# Patient Record
Sex: FEMALE | HISPANIC OR LATINO | Employment: OTHER | URBAN - METROPOLITAN AREA
[De-identification: names, ages, dates, MRNs, and addresses within clinical notes are randomized per-mention and may not be internally consistent; named-entity substitution may affect disease eponyms.]

---

## 2019-06-05 ENCOUNTER — TELEPHONE (OUTPATIENT)
Dept: CARDIOLOGY | Facility: CLINIC | Age: 84
End: 2019-06-05

## 2019-06-05 NOTE — TELEPHONE ENCOUNTER
Pt wanting to be seen by dr madden, pt passing out and sleeping a lot per patient daughter, wanting to be seen as soon as she can. The patient has an appointment July 8 @ 2:00, call pt daughter gina @ [phone]

## 2019-07-23 PROBLEM — Z98.890 HISTORY OF LOOP RECORDER: Status: ACTIVE | Noted: 2019-07-23

## 2019-07-23 PROBLEM — E78.5 DYSLIPIDEMIA: Status: ACTIVE | Noted: 2019-07-23

## 2019-07-23 PROBLEM — I10 ESSENTIAL HYPERTENSION: Status: ACTIVE | Noted: 2019-07-23

## 2019-07-23 PROBLEM — R55 SYNCOPE AND COLLAPSE: Status: ACTIVE | Noted: 2019-07-23

## 2019-07-23 PROBLEM — I25.10 CAD (CORONARY ARTERY DISEASE): Status: ACTIVE | Noted: 2019-07-23

## 2019-07-23 PROBLEM — Z95.1 S/P CABG (CORONARY ARTERY BYPASS GRAFT): Status: ACTIVE | Noted: 2019-07-23

## 2019-07-24 ENCOUNTER — OFFICE VISIT (OUTPATIENT)
Dept: CARDIOLOGY | Facility: CLINIC | Age: 84
End: 2019-07-24

## 2019-07-24 VITALS
SYSTOLIC BLOOD PRESSURE: 138 MMHG | BODY MASS INDEX: 24.54 KG/M2 | WEIGHT: 125 LBS | HEIGHT: 60 IN | HEART RATE: 65 BPM | DIASTOLIC BLOOD PRESSURE: 78 MMHG

## 2019-07-24 DIAGNOSIS — I47.29 NONSUSTAINED VENTRICULAR TACHYCARDIA (HCC): ICD-10-CM

## 2019-07-24 DIAGNOSIS — I25.10 CORONARY ARTERY DISEASE INVOLVING NATIVE CORONARY ARTERY OF NATIVE HEART WITHOUT ANGINA PECTORIS: Primary | ICD-10-CM

## 2019-07-24 DIAGNOSIS — I10 ESSENTIAL HYPERTENSION: ICD-10-CM

## 2019-07-24 DIAGNOSIS — R55 SYNCOPE AND COLLAPSE: ICD-10-CM

## 2019-07-24 DIAGNOSIS — I25.5 ISCHEMIC CARDIOMYOPATHY: ICD-10-CM

## 2019-07-24 DIAGNOSIS — Z98.890 HISTORY OF LOOP RECORDER: ICD-10-CM

## 2019-07-24 PROCEDURE — 93291 INTERROG DEV EVAL SCRMS IP: CPT | Performed by: INTERNAL MEDICINE

## 2019-07-24 PROCEDURE — 99214 OFFICE O/P EST MOD 30 MIN: CPT | Performed by: INTERNAL MEDICINE

## 2019-07-24 RX ORDER — BUMETANIDE 1 MG/1
1 TABLET ORAL AS NEEDED
COMMUNITY
Start: 2018-01-08 | End: 2021-08-31

## 2019-07-24 RX ORDER — TIZANIDINE 2 MG/1
2 TABLET ORAL 2 TIMES DAILY
COMMUNITY
End: 2021-03-15 | Stop reason: ALTCHOICE

## 2019-07-24 RX ORDER — ERGOCALCIFEROL 1.25 MG/1
1 CAPSULE ORAL
COMMUNITY
Start: 2018-10-02 | End: 2020-05-05

## 2019-07-24 RX ORDER — LEVOTHYROXINE SODIUM 0.05 MG/1
50 TABLET ORAL DAILY
COMMUNITY
Start: 2019-07-22 | End: 2020-07-21

## 2019-07-24 RX ORDER — LANOLIN ALCOHOL/MO/W.PET/CERES
1000 CREAM (GRAM) TOPICAL DAILY
COMMUNITY
Start: 2019-07-22 | End: 2020-05-05

## 2019-07-24 RX ORDER — LORATADINE 10 MG/1
10 TABLET ORAL DAILY
COMMUNITY
Start: 2019-07-22 | End: 2020-07-21

## 2019-07-24 RX ORDER — DOXAZOSIN MESYLATE 4 MG/1
4 TABLET ORAL DAILY
COMMUNITY
Start: 2019-07-12 | End: 2019-11-25 | Stop reason: ALTCHOICE

## 2019-07-24 RX ORDER — CARVEDILOL 6.25 MG/1
6.25 TABLET ORAL 2 TIMES DAILY
COMMUNITY
Start: 2018-08-01 | End: 2019-08-01

## 2019-07-24 RX ORDER — TRIAMCINOLONE ACETONIDE 55 UG/1
1 SPRAY, METERED NASAL DAILY
COMMUNITY
Start: 2019-07-22 | End: 2021-03-15 | Stop reason: ALTCHOICE

## 2019-07-25 NOTE — ASSESSMENT & PLAN NOTE
She had an episode of nonsustained ventricular tachycardia on June 30, 2019.  She reports no symptoms on that day.  This is the first episode it has been noted since implantation of her loop recorder.  She appears to be intolerant of her carvedilol and I will therefore switch her to metoprolol

## 2019-07-25 NOTE — ASSESSMENT & PLAN NOTE
She is on nitrates, diuretics, beta-blocker therapy and I hope to initiate ACE inhibitor therapy.  I did not want to initiate two new medications at once.

## 2019-07-25 NOTE — PROGRESS NOTES
Subjective:     Encounter Date:07/24/2019      Patient ID: Evita David is a 89 y.o. female.    Chief Complaint:  Chief Complaint   Patient presents with   • Coronary Artery Disease       HPI:  I saw Ms. David in the office today.  He is an 89-year-old female with known coronary disease.  He is Citizen of [place]-speaking and the conversation was interpreted and translated by a member of the office staff.  Evita also has a history of hypertension, dyslipidemia and left ventricular dysfunction.  She had an echocardiogram in December 2018 which demonstrated an ejection fraction of 45 to 50%.  He also has moderate mitral and aortic insufficiency.  She has a loop recorder implanted secondary to multiple syncopal episodes.  She has visited the emergency room on several occasions syncope and no etiology has been found.  Her loop recorder has not demonstrated any arrhythmias until today's reading.  She had an episode of ventricular tachycardia on June 30, 2019.  Does not report any symptoms occurring on that day.    Evita states she had an episode of dizziness this morning.  Her daughter-in-law states that her systolic pressure was 90 mmHg.  Evita has symptoms of chest discomfort or shortness of air.  She does state that he has begun to experience lower extremity edema.  She has not been taking her Bumex on a routine basis just restarted it today.  Upon discussion, the patient and her daughter-in-law feel like the carvedilol is causing symptoms of cough and sneezing.  They state that after their most recent visit to the emergency room, the ER decreased her dose from 12.5 mg twice daily to 6.25 mg once daily.  This was in April 2019.    The following portions of the patient's history were reviewed and updated as appropriate: allergies, current medications, past family history, past medical history, past social history, past surgical history and problem list.    Problem List:  Patient Active Problem List   Diagnosis   •  CAD (coronary artery disease)   • S/P CABG (coronary artery bypass graft)   • Syncope and collapse   • History of loop recorder   • Dyslipidemia   • Essential hypertension   • Nonsustained ventricular tachycardia (CMS/HCC)   • Ischemic cardiomyopathy       Past Medical History:  Past Medical History:   Diagnosis Date   • Anemia    • Arthritis    • Back pain    • Bone pain    • Cancer (CMS/HCC)     BREAST   • Coronary artery disease     2013: LAD with mild proximal disease. Mid with 50-60%. D1 with 70-80%. Circ calcified. OM2 with . Distal OM filled by collaterals: RCA not engaged. SVG to RCA patent with 50-60% distally. Attempt made for PCI to occluded circ , unsuccessful   • Dementia    • Depression    • Dizziness    • Dry eyes    • High cholesterol    • Hypertension    • Indigestion    • Leg weakness, bilateral    • Loss of balance    • Loss of hearing    • Lung cancer (CMS/HCC)     POSS  F/U DR [place]   • Papillary adenocarcinoma of thyroid (CMS/HCC)    • Sleep apnea    • Stomach pain     RECENTLY   • UTI (urinary tract infection)     ON MED PRESENTLY   • Vaginal discharge        Past Surgical History:  Past Surgical History:   Procedure Laterality Date   • APPENDECTOMY     • BREAST EXCISIONAL BIOPSY Right    • CARDIAC CATHETERIZATION     • CARDIAC SURGERY      2002  UNSURE OF PROCEDURE [facility]   • CATARACT EXTRACTION Left    • CHOLECYSTECTOMY OPEN     • CORONARY ARTERY BYPASS GRAFT     • EYE SURGERY     • THYROIDECTOMY Right 7/21/2016    Procedure: RT THYROIDECTOMY  W/ F.S.;  Surgeon: Travis Segovia MD;  [place] [facility] [place] [facility] [place]:        Social History:  Social History     Socioeconomic History   • Marital status:      Spouse name: Not on file   • Number of children: Not on file   • Years of education: Not on file   • Highest education level: Not on file   Tobacco Use   • Smoking status: Never Smoker   • Smokeless tobacco: Never Used   Substance and Sexual Activity   • Alcohol use: No   •  "Drug use: No   • Sexual activity: Defer       Allergies:  Allergies   Allergen Reactions   • Cephalexin Unknown (See Comments)     .           ROS:  Review of Systems   Constitution: Positive for malaise/fatigue. Negative for chills, decreased appetite, fever, weight gain and weight loss.   HENT: Positive for congestion. Negative for hoarse voice, nosebleeds and sore throat.    Eyes: Negative for blurred vision, double vision and visual disturbance.   Cardiovascular: Positive for leg swelling and syncope. Negative for chest pain, claudication, dyspnea on exertion, irregular heartbeat, near-syncope, orthopnea, palpitations and paroxysmal nocturnal dyspnea.   Respiratory: Negative for cough, hemoptysis, shortness of breath, sleep disturbances due to breathing, snoring, sputum production and wheezing.    Endocrine: Negative for cold intolerance, heat intolerance, polydipsia and polyuria.   Hematologic/Lymphatic: Negative for adenopathy and bleeding problem. Does not bruise/bleed easily.   Skin: Negative for flushing, itching, nail changes and rash.   Musculoskeletal: Positive for joint pain. Negative for arthritis, back pain, muscle cramps, muscle weakness, myalgias and neck pain.   Gastrointestinal: Positive for heartburn. Negative for bloating, abdominal pain, anorexia, change in bowel habit, constipation, diarrhea, hematemesis, hematochezia, jaundice, melena, nausea and vomiting.   Genitourinary: Negative for dysuria, hematuria and nocturia.   Neurological: Positive for dizziness. Negative for brief paralysis, disturbances in coordination, excessive daytime sleepiness, headaches, light-headedness, loss of balance, numbness, paresthesias, seizures and vertigo.   Psychiatric/Behavioral: Negative for altered mental status and depression. The patient is not nervous/anxious.    Allergic/Immunologic: Negative for environmental allergies and hives.          Objective:         /78   Pulse 65   Ht 152.4 cm (60\")   " Wt 56.7 kg (125 lb)   BMI 24.41 kg/m²     Physical Exam   Constitutional: She is oriented to person, place, and time. She appears well-developed and well-nourished. No distress.   HENT:   Head: Normocephalic and atraumatic.   Mouth/Throat: Oropharynx is clear and moist.   Eyes: Conjunctivae and EOM are normal. Pupils are equal, round, and reactive to light. No scleral icterus.   Neck: Normal range of motion. Neck supple. No thyromegaly present.   Cardiovascular: Normal rate, regular rhythm, S1 normal, S2 normal and intact distal pulses.  No extrasystoles are present. PMI is not displaced. Exam reveals no gallop, no S3, no S4, no friction rub and no decreased pulses.   Murmur ( There is a 2/6 systolic murmur heard at the left lower sternal border and apex.  There is a 1/6 diastolic murmur heard at the mid left sternal border) heard.  Pulses:       Carotid pulses are 2+ on the right side, and 2+ on the left side.       Dorsalis pedis pulses are 2+ on the right side, and 2+ on the left side.        Posterior tibial pulses are 1+ on the right side, and 1+ on the left side.   Pulmonary/Chest: Effort normal and breath sounds normal. No respiratory distress. She has no wheezes. She has no rales.   Abdominal: Soft. Bowel sounds are normal. She exhibits no distension and no mass. There is no tenderness. There is no rebound and no guarding.   Musculoskeletal: Normal range of motion. She exhibits edema ( There is 1-2+ pitting edema in the lower extremities bilaterally).   Lymphadenopathy:     She has no cervical adenopathy.   Neurological: She is alert and oriented to person, place, and time. Coordination normal.   Skin: Skin is warm and dry. No rash noted. She is not diaphoretic. No pallor.   Psychiatric: She has a normal mood and affect. Her behavior is normal.   Nursing note and vitals reviewed.      In-Office Procedure(s):  Procedures    ASCVD RIsk Score::  The ASCVD Risk score ([place] BEST Jr., et al., 2013) failed to  calculate for the following reasons:    The 2013 ASCVD risk score is only valid for ages 40 to 79    Recent Radiology:  Imaging Results (most recent)     None          Lab Review:   not applicable           Invalid input(s): ALKPO4                        Invalid input(s): LDLCALC                  Problems Addressed this Visit        Cardiovascular and Mediastinum    CAD (coronary artery disease) - Primary     Currently without angina         Relevant Medications    carvedilol (COREG) 6.25 MG tablet    Syncope and collapse     Recurrent episodes of syncope.  He has been evaluated by numerous physicians and no etiology has been discovered.  There is a loop recorder in place.         Essential hypertension     Hypertension is controlled.         Relevant Medications    bumetanide (BUMEX) 1 MG tablet    carvedilol (COREG) 6.25 MG tablet    doxazosin (CARDURA) 4 MG tablet    Nonsustained ventricular tachycardia (CMS/HCC)     She had an episode of nonsustained ventricular tachycardia on June 30, 2019.  She reports no symptoms on that day.  This is the first episode it has been noted since implantation of her loop recorder.  She appears to be intolerant of her carvedilol and I will therefore switch her to metoprolol         Relevant Medications    carvedilol (COREG) 6.25 MG tablet    Ischemic cardiomyopathy     She is on nitrates, diuretics, beta-blocker therapy and I hope to initiate ACE inhibitor therapy.  I did not want to initiate two new medications at once.         Relevant Medications    carvedilol (COREG) 6.25 MG tablet       Other    History of loop recorder          Gail Uriarte MD  07/24/19  .

## 2019-08-26 ENCOUNTER — OFFICE VISIT (OUTPATIENT)
Dept: CARDIOLOGY | Facility: CLINIC | Age: 84
End: 2019-08-26

## 2019-08-26 VITALS
DIASTOLIC BLOOD PRESSURE: 80 MMHG | BODY MASS INDEX: 24.54 KG/M2 | WEIGHT: 125 LBS | HEART RATE: 61 BPM | HEIGHT: 60 IN | SYSTOLIC BLOOD PRESSURE: 140 MMHG

## 2019-08-26 DIAGNOSIS — I25.5 ISCHEMIC CARDIOMYOPATHY: Primary | ICD-10-CM

## 2019-08-26 DIAGNOSIS — Z98.890 HISTORY OF LOOP RECORDER: ICD-10-CM

## 2019-08-26 DIAGNOSIS — I10 ESSENTIAL HYPERTENSION: ICD-10-CM

## 2019-08-26 DIAGNOSIS — I25.10 CORONARY ARTERY DISEASE INVOLVING NATIVE CORONARY ARTERY OF NATIVE HEART WITHOUT ANGINA PECTORIS: ICD-10-CM

## 2019-08-26 DIAGNOSIS — I47.29 NONSUSTAINED VENTRICULAR TACHYCARDIA (HCC): ICD-10-CM

## 2019-08-26 PROBLEM — N28.9 RENAL INSUFFICIENCY: Status: ACTIVE | Noted: 2019-08-26

## 2019-08-26 PROBLEM — R74.8 ELEVATED LIVER ENZYMES: Status: ACTIVE | Noted: 2019-08-26

## 2019-08-26 PROCEDURE — 99214 OFFICE O/P EST MOD 30 MIN: CPT | Performed by: INTERNAL MEDICINE

## 2019-08-26 RX ORDER — METOPROLOL SUCCINATE 25 MG/1
25 TABLET, EXTENDED RELEASE ORAL DAILY
Qty: 90 TABLET | Refills: 3 | Status: SHIPPED | OUTPATIENT
Start: 2019-08-26 | End: 2020-08-17

## 2019-08-26 RX ORDER — METOPROLOL TARTRATE 50 MG/1
50 TABLET, FILM COATED ORAL
COMMUNITY
Start: 2019-08-23 | End: 2019-08-26

## 2019-08-26 RX ORDER — LEVETIRACETAM 500 MG/1
250 TABLET ORAL 2 TIMES DAILY
Refills: 0 | COMMUNITY
Start: 2019-07-27 | End: 2019-11-25 | Stop reason: ALTCHOICE

## 2019-08-26 NOTE — ASSESSMENT & PLAN NOTE
I did explain via  that metoprolol tartrate will only cover approximately half the day due to the shorter half-life.  I explained if they want to get this medication once a day, I would need to change this to metoprolol extended release.  After much discussion they have decided to go this route.  I have sent a prescription and form his metoprolol extended release 25 mg daily

## 2019-08-26 NOTE — ASSESSMENT & PLAN NOTE
Her blood pressure remains slightly elevated in the office.  Her daughter states that she is obtaining readings in the 120s to 130s over 80s at home

## 2019-11-25 ENCOUNTER — OFFICE VISIT (OUTPATIENT)
Dept: CARDIOLOGY | Facility: CLINIC | Age: 84
End: 2019-11-25

## 2019-11-25 VITALS
DIASTOLIC BLOOD PRESSURE: 78 MMHG | BODY MASS INDEX: 26.11 KG/M2 | HEART RATE: 75 BPM | WEIGHT: 133 LBS | HEIGHT: 60 IN | SYSTOLIC BLOOD PRESSURE: 144 MMHG

## 2019-11-25 DIAGNOSIS — Z98.890 HISTORY OF LOOP RECORDER: ICD-10-CM

## 2019-11-25 DIAGNOSIS — I25.5 ISCHEMIC CARDIOMYOPATHY: Primary | ICD-10-CM

## 2019-11-25 DIAGNOSIS — I25.10 CORONARY ARTERY DISEASE INVOLVING NATIVE CORONARY ARTERY OF NATIVE HEART WITHOUT ANGINA PECTORIS: ICD-10-CM

## 2019-11-25 DIAGNOSIS — I38 VALVULAR HEART DISEASE: ICD-10-CM

## 2019-11-25 DIAGNOSIS — Z95.1 S/P CABG (CORONARY ARTERY BYPASS GRAFT): ICD-10-CM

## 2019-11-25 DIAGNOSIS — I10 ESSENTIAL HYPERTENSION: ICD-10-CM

## 2019-11-25 DIAGNOSIS — E78.5 DYSLIPIDEMIA: ICD-10-CM

## 2019-11-25 PROCEDURE — 93291 INTERROG DEV EVAL SCRMS IP: CPT | Performed by: INTERNAL MEDICINE

## 2019-11-25 PROCEDURE — 99213 OFFICE O/P EST LOW 20 MIN: CPT | Performed by: INTERNAL MEDICINE

## 2019-11-25 NOTE — PROGRESS NOTES
Subjective:     Encounter Date:11/25/2019      Patient ID: Evita David is a 89 y.o. female.    Chief Complaint:  Chief Complaint   Patient presents with   • Cardiomyopathy   • Coronary Artery Disease       HPI:  History of Present Illness  I saw Ms. David in the office today.  She is a 89-year-old female with a history of coronary artery disease.  She is status post previous coronary artery bypass grafting.  She has a history of essential hypertension, nonsustained ventricular tachycardia, ischemic cardiomyopathy (in December 2018, EF was 45 to 50%).  She has aortic insufficiency.  She has a loop recorder in place and interrogation reveals no recent nonsustained ventricular tachycardia.    Evita is in the office today for ongoing evaluation of her cardiac status.  She does complain of lower extremity edema but she does not take her Bumex.  She is contemplating dental extraction and dentures.  She states that she would need approximately 4 teeth extracted.  She has not had any recent overt heart failure symptoms.  She does have chest discomfort but it is atypical for myocardial ischemia.      The following portions of the patient's history were reviewed and updated as appropriate: allergies, current medications, past family history, past medical history, past social history, past surgical history and problem list.    Problem List:  Patient Active Problem List   Diagnosis   • CAD (coronary artery disease)   • S/P CABG (coronary artery bypass graft)   • Syncope and collapse   • History of loop recorder   • Dyslipidemia   • Essential hypertension   • Nonsustained ventricular tachycardia (CMS/HCC)   • Ischemic cardiomyopathy   • Renal insufficiency   • Elevated liver enzymes   • Valvular heart disease       Past Medical History:  Past Medical History:   Diagnosis Date   • Anemia    • Arthritis    • Back pain    • Bone pain    • Cancer (CMS/HCC)     BREAST   • Coronary artery disease     2013: LAD with mild  proximal disease. Mid with 50-60%. D1 with 70-80%. Circ calcified. OM2 with . Distal OM filled by collaterals: RCA not engaged. SVG to RCA patent with 50-60% distally. Attempt made for PCI to occluded circ , unsuccessful   • Dementia (CMS/HCC)    • Depression    • Dizziness    • Dry eyes    • High cholesterol    • Hypertension    • Indigestion    • Leg weakness, bilateral    • Loss of balance    • Loss of hearing    • Lung cancer (CMS/HCC)     POSS  F/U DR [place]   • Papillary adenocarcinoma of thyroid (CMS/HCC)    • Sleep apnea    • Stomach pain     RECENTLY   • UTI (urinary tract infection)     ON MED PRESENTLY   • Vaginal discharge        Past Surgical History:  Past Surgical History:   Procedure Laterality Date   • APPENDECTOMY     • BREAST EXCISIONAL BIOPSY Right    • CARDIAC CATHETERIZATION     • CARDIAC SURGERY      2002  UNSURE OF PROCEDURE [facility]   • CATARACT EXTRACTION Left    • CHOLECYSTECTOMY OPEN     • CORONARY ARTERY BYPASS GRAFT     • EYE SURGERY     • THYROIDECTOMY Right 7/21/2016    Procedure: RT THYROIDECTOMY  W/ F.S.;  Surgeon: Travis Segovia MD;  [place] [facility] [place] [facility] [place]  Service:        Social History:  Social History     Socioeconomic History   • Marital status:      Spouse name: Not on file   • Number of children: Not on file   • Years of education: Not on file   • Highest education level: Not on file   Tobacco Use   • Smoking status: Never Smoker   • Smokeless tobacco: Never Used   Substance and Sexual Activity   • Alcohol use: No   • Drug use: No   • Sexual activity: Defer       Allergies:  Allergies   Allergen Reactions   • Cephalexin Unknown (See Comments)     .       Immunizations:    There is no immunization history on file for this patient.    ROS:  Review of Systems   Constitution: Positive for malaise/fatigue. Negative for chills, decreased appetite, fever, weight gain and weight loss.   HENT: Negative for hoarse voice, nosebleeds and sore throat.    Eyes: Negative for  "blurred vision, double vision and visual disturbance.   Cardiovascular: Positive for chest pain ( Brief) and leg swelling. Negative for claudication, dyspnea on exertion, irregular heartbeat, near-syncope, orthopnea, palpitations and paroxysmal nocturnal dyspnea.   Respiratory: Negative for cough, hemoptysis, shortness of breath, sleep disturbances due to breathing, snoring, sputum production and wheezing.    Endocrine: Negative for cold intolerance, heat intolerance, polydipsia and polyuria.   Hematologic/Lymphatic: Negative for adenopathy and bleeding problem. Does not bruise/bleed easily.   Skin: Negative for flushing, itching, nail changes and rash.   Musculoskeletal: Positive for joint pain and muscle cramps. Negative for arthritis, back pain, muscle weakness, myalgias and neck pain.   Gastrointestinal: Positive for heartburn. Negative for bloating, abdominal pain, anorexia, change in bowel habit, constipation, diarrhea, hematemesis, hematochezia, jaundice, melena, nausea and vomiting.   Genitourinary: Negative for dysuria, hematuria and nocturia.   Neurological: Positive for dizziness. Negative for brief paralysis, disturbances in coordination, excessive daytime sleepiness, headaches, light-headedness, loss of balance, numbness, paresthesias, seizures and vertigo.   Psychiatric/Behavioral: Negative for altered mental status and depression. The patient is not nervous/anxious.    Allergic/Immunologic: Negative for environmental allergies and hives.          Objective:         /78   Pulse 75   Ht 152.4 cm (60\")   Wt 60.3 kg (133 lb)   BMI 25.97 kg/m²     Physical Exam   Constitutional: She is oriented to person, place, and time. She appears well-developed and well-nourished. No distress.   HENT:   Head: Normocephalic and atraumatic.   Mouth/Throat: Oropharynx is clear and moist.   Eyes: Conjunctivae and EOM are normal. Pupils are equal, round, and reactive to light. No scleral icterus.   Neck: Normal " range of motion. Neck supple. No thyromegaly present.   Cardiovascular: Normal rate, regular rhythm, S1 normal, S2 normal and intact distal pulses.  No extrasystoles are present. PMI is not displaced. Exam reveals no gallop, no S3, no S4, no friction rub and no decreased pulses.   Murmur ( There is a 2/6 systolic murmur heard at the left lower sternal border and apex.  There is a 1/6 diastolic murmur heard at the mid left sternal border) heard.  Pulses:       Carotid pulses are 2+ on the right side, and 2+ on the left side.       Dorsalis pedis pulses are 2+ on the right side, and 2+ on the left side.        Posterior tibial pulses are 2+ on the right side, and 2+ on the left side.   Pulmonary/Chest: Effort normal and breath sounds normal. No respiratory distress. She has no wheezes. She has no rales.   Abdominal: Soft. Bowel sounds are normal. She exhibits no distension and no mass. There is no tenderness. There is no rebound and no guarding.   Musculoskeletal: Edema:  There is 1-2+ pitting edema in the lower extremities bilaterally.   Slow gait   Lymphadenopathy:     She has no cervical adenopathy.   Neurological: She is alert and oriented to person, place, and time. Coordination normal.   Skin: Skin is warm and dry. No rash noted. She is not diaphoretic. No pallor.   Psychiatric: She has a normal mood and affect. Her behavior is normal.   Nursing note and vitals reviewed.      In-Office Procedure(s):  Procedures    ASCVD RIsk Score::  The ASCVD Risk score ([place] NASIR Jr., et al., 2013) failed to calculate for the following reasons:    The 2013 ASCVD risk score is only valid for ages 40 to 79    Recent Radiology:  Imaging Results (Most Recent)     None          Lab Review:   not applicable             Assessment:          Diagnosis Plan   1. Ischemic cardiomyopathy  Adult Transthoracic Echo Complete W/ Cont if Necessary Per Protocol   2. Essential hypertension     3. S/P CABG (coronary artery bypass graft)     4.  History of loop recorder     5. Dyslipidemia     6. Coronary artery disease involving native coronary artery of native heart without angina pectoris     7. Valvular heart disease  Adult Transthoracic Echo Complete W/ Cont if Necessary Per Protocol          Plan:   1.  Ischemic cardiomyopathy  I will repeat her echocardiogram to make sure that her ventricular function has remained stable    2.  Essential hypertension  Her blood pressure is mildly elevated    3.  CAD/status post CABG  She has had previous coronary artery bypass grafting.  She has been managed medically and actually has done quite well    4.  Valvular heart disease  She has moderate aortic and mitral insufficiency.  She does have some lower extremity edema but she has not taken her Bumex.  I have encouraged her to do so    I feel like she would tolerate dental extraction from a cardiac standpoint        Level of Care:                 Gail Uriarte MD  11/25/19  .

## 2019-12-04 ENCOUNTER — CLINICAL SUPPORT (OUTPATIENT)
Dept: CARDIOLOGY | Facility: CLINIC | Age: 84
End: 2019-12-04

## 2019-12-04 ENCOUNTER — HOSPITAL ENCOUNTER (OUTPATIENT)
Dept: CARDIOLOGY | Facility: HOSPITAL | Age: 84
Discharge: HOME | End: 2019-12-04
Admitting: INTERNAL MEDICINE

## 2019-12-04 VITALS
HEART RATE: 70 BPM | HEIGHT: 60 IN | SYSTOLIC BLOOD PRESSURE: 199 MMHG | DIASTOLIC BLOOD PRESSURE: 98 MMHG | WEIGHT: 133 LBS | BODY MASS INDEX: 26.11 KG/M2

## 2019-12-04 DIAGNOSIS — I38 VALVULAR HEART DISEASE: ICD-10-CM

## 2019-12-04 DIAGNOSIS — R55 SYNCOPE AND COLLAPSE: ICD-10-CM

## 2019-12-04 DIAGNOSIS — I25.5 ISCHEMIC CARDIOMYOPATHY: ICD-10-CM

## 2019-12-04 PROCEDURE — 93299 PR REM INTERROG ICPMS/SCRMS <30 D TECH REVIEW: CPT | Performed by: INTERNAL MEDICINE

## 2019-12-04 PROCEDURE — 93306 TTE W/DOPPLER COMPLETE: CPT

## 2019-12-04 PROCEDURE — 93298 REM INTERROG DEV EVAL SCRMS: CPT | Performed by: INTERNAL MEDICINE

## 2019-12-04 PROCEDURE — 93306 TTE W/DOPPLER COMPLETE: CPT | Performed by: INTERNAL MEDICINE

## 2019-12-06 ENCOUNTER — TELEPHONE (OUTPATIENT)
Dept: CARDIOLOGY | Facility: CLINIC | Age: 84
End: 2019-12-06

## 2019-12-06 NOTE — TELEPHONE ENCOUNTER
----- Message from Xander Louise sent at 12/6/2019  9:06 AM EST -----  Contact: [phone]  Patient's daughter called(Sarita), would like to know if patient can get a letter for cardiac clearance to have teeth extraction @ [facility]. Would like to  letter when ready.    [facility] [employer] [facility] Tel.# [phone].

## 2019-12-08 LAB
AORTIC DIMENSIONLESS INDEX: 0.7 (DI)
BH CV ECHO MEAS - ACS: 1.9 CM
BH CV ECHO MEAS - AI DEC SLOPE: 211 CM/SEC^2
BH CV ECHO MEAS - AI MAX PG: 50.1 MMHG
BH CV ECHO MEAS - AI MAX VEL: 354 CM/SEC
BH CV ECHO MEAS - AI P1/2T: 491.4 MSEC
BH CV ECHO MEAS - AO MAX PG (FULL): 3.8 MMHG
BH CV ECHO MEAS - AO MAX PG: 7.5 MMHG
BH CV ECHO MEAS - AO MEAN PG (FULL): 2 MMHG
BH CV ECHO MEAS - AO MEAN PG: 4 MMHG
BH CV ECHO MEAS - AO ROOT AREA (BSA CORRECTED): 1.8
BH CV ECHO MEAS - AO ROOT AREA: 6.6 CM^2
BH CV ECHO MEAS - AO ROOT DIAM: 2.9 CM
BH CV ECHO MEAS - AO V2 MAX: 137 CM/SEC
BH CV ECHO MEAS - AO V2 MEAN: 85.2 CM/SEC
BH CV ECHO MEAS - AO V2 VTI: 26.9 CM
BH CV ECHO MEAS - AVA(I,A): 2.1 CM^2
BH CV ECHO MEAS - AVA(I,D): 2.1 CM^2
BH CV ECHO MEAS - AVA(V,A): 2 CM^2
BH CV ECHO MEAS - AVA(V,D): 2 CM^2
BH CV ECHO MEAS - BSA(HAYCOCK): 1.6 M^2
BH CV ECHO MEAS - BSA: 1.6 M^2
BH CV ECHO MEAS - BZI_BMI: 26 KILOGRAMS/M^2
BH CV ECHO MEAS - BZI_METRIC_HEIGHT: 152.4 CM
BH CV ECHO MEAS - BZI_METRIC_WEIGHT: 60.3 KG
BH CV ECHO MEAS - EDV(CUBED): 132.7 ML
BH CV ECHO MEAS - EDV(MOD-SP2): 75 ML
BH CV ECHO MEAS - EDV(MOD-SP4): 73 ML
BH CV ECHO MEAS - EDV(TEICH): 123.8 ML
BH CV ECHO MEAS - EF(CUBED): 55.3 %
BH CV ECHO MEAS - EF(MOD-BP): 55 %
BH CV ECHO MEAS - EF(MOD-SP2): 57.3 %
BH CV ECHO MEAS - EF(MOD-SP4): 56.2 %
BH CV ECHO MEAS - EF(TEICH): 46.8 %
BH CV ECHO MEAS - ESV(CUBED): 59.3 ML
BH CV ECHO MEAS - ESV(MOD-SP2): 32 ML
BH CV ECHO MEAS - ESV(MOD-SP4): 32 ML
BH CV ECHO MEAS - ESV(TEICH): 65.9 ML
BH CV ECHO MEAS - FS: 23.5 %
BH CV ECHO MEAS - IVS/LVPW: 1.3
BH CV ECHO MEAS - IVSD: 1.4 CM
BH CV ECHO MEAS - LAT PEAK E' VEL: 5.9 CM/SEC
BH CV ECHO MEAS - LV DIASTOLIC VOL/BSA (35-75): 46.5 ML/M^2
BH CV ECHO MEAS - LV MASS(C)D: 255.5 GRAMS
BH CV ECHO MEAS - LV MASS(C)DI: 162.8 GRAMS/M^2
BH CV ECHO MEAS - LV MAX PG: 3.7 MMHG
BH CV ECHO MEAS - LV MEAN PG: 2 MMHG
BH CV ECHO MEAS - LV SYSTOLIC VOL/BSA (12-30): 20.4 ML/M^2
BH CV ECHO MEAS - LV V1 MAX: 96.8 CM/SEC
BH CV ECHO MEAS - LV V1 MEAN: 62.1 CM/SEC
BH CV ECHO MEAS - LV V1 VTI: 20.1 CM
BH CV ECHO MEAS - LVIDD: 5.1 CM
BH CV ECHO MEAS - LVIDS: 3.9 CM
BH CV ECHO MEAS - LVLD AP2: 6.9 CM
BH CV ECHO MEAS - LVLD AP4: 6.6 CM
BH CV ECHO MEAS - LVLS AP2: 5.2 CM
BH CV ECHO MEAS - LVLS AP4: 5.9 CM
BH CV ECHO MEAS - LVOT AREA (M): 2.8 CM^2
BH CV ECHO MEAS - LVOT AREA: 2.8 CM^2
BH CV ECHO MEAS - LVOT DIAM: 1.9 CM
BH CV ECHO MEAS - LVPWD: 1.1 CM
BH CV ECHO MEAS - MED PEAK E' VEL: 4.05 CM/SEC
BH CV ECHO MEAS - MV A DUR: 0.14 SEC
BH CV ECHO MEAS - MV A MAX VEL: 120 CM/SEC
BH CV ECHO MEAS - MV DEC SLOPE: 724 CM/SEC^2
BH CV ECHO MEAS - MV DEC TIME: 161 SEC
BH CV ECHO MEAS - MV E MAX VEL: 123 CM/SEC
BH CV ECHO MEAS - MV E/A: 1
BH CV ECHO MEAS - MV MAX PG: 7.6 MMHG
BH CV ECHO MEAS - MV MEAN PG: 4 MMHG
BH CV ECHO MEAS - MV P1/2T MAX VEL: 145 CM/SEC
BH CV ECHO MEAS - MV P1/2T: 58.7 MSEC
BH CV ECHO MEAS - MV V2 MAX: 138 CM/SEC
BH CV ECHO MEAS - MV V2 MEAN: 101 CM/SEC
BH CV ECHO MEAS - MV V2 VTI: 30.9 CM
BH CV ECHO MEAS - MVA P1/2T LCG: 1.5 CM^2
BH CV ECHO MEAS - MVA(P1/2T): 3.8 CM^2
BH CV ECHO MEAS - MVA(VTI): 1.8 CM^2
BH CV ECHO MEAS - PA ACC TIME: 0.09 SEC
BH CV ECHO MEAS - PA MAX PG (FULL): 1.1 MMHG
BH CV ECHO MEAS - PA MAX PG: 3.4 MMHG
BH CV ECHO MEAS - PA PR(ACCEL): 40.8 MMHG
BH CV ECHO MEAS - PA V2 MAX: 92.3 CM/SEC
BH CV ECHO MEAS - PI END-D VEL: 167 CM/SEC
BH CV ECHO MEAS - PULM A REVS DUR: 0.11 SEC
BH CV ECHO MEAS - PULM A REVS VEL: 37.8 CM/SEC
BH CV ECHO MEAS - PULM DIAS VEL: 63.5 CM/SEC
BH CV ECHO MEAS - PULM S/D: 0.76
BH CV ECHO MEAS - PULM SYS VEL: 48.5 CM/SEC
BH CV ECHO MEAS - RAP SYSTOLE: 3 MMHG
BH CV ECHO MEAS - RV MAX PG: 2.3 MMHG
BH CV ECHO MEAS - RV MEAN PG: 1 MMHG
BH CV ECHO MEAS - RV V1 MAX: 75.8 CM/SEC
BH CV ECHO MEAS - RV V1 MEAN: 48.3 CM/SEC
BH CV ECHO MEAS - RV V1 VTI: 14.6 CM
BH CV ECHO MEAS - RVSP: 36 MMHG
BH CV ECHO MEAS - SI(AO): 113.2 ML/M^2
BH CV ECHO MEAS - SI(CUBED): 46.7 ML/M^2
BH CV ECHO MEAS - SI(LVOT): 36.3 ML/M^2
BH CV ECHO MEAS - SI(MOD-SP2): 27.4 ML/M^2
BH CV ECHO MEAS - SI(MOD-SP4): 26.1 ML/M^2
BH CV ECHO MEAS - SI(TEICH): 36.9 ML/M^2
BH CV ECHO MEAS - SV(AO): 177.7 ML
BH CV ECHO MEAS - SV(CUBED): 73.3 ML
BH CV ECHO MEAS - SV(LVOT): 57 ML
BH CV ECHO MEAS - SV(MOD-SP2): 43 ML
BH CV ECHO MEAS - SV(MOD-SP4): 41 ML
BH CV ECHO MEAS - SV(TEICH): 57.9 ML
BH CV ECHO MEAS - TAPSE (>1.6): 1.3 CM2
BH CV ECHO MEAS - TR MAX PG: 33
BH CV ECHO MEAS - TR MAX VEL: 287 CM/SEC
BH CV ECHO MEASUREMENTS AVERAGE E/E' RATIO: 24.72
BH CV XLRA - RV BASE: 3.3 CM
BH CV XLRA - TDI S': 5.8 CM/SEC
LEFT ATRIUM VOLUME INDEX: 30.1 ML/M2
MAXIMAL PREDICTED HEART RATE: 131 BPM
STRESS TARGET HR: 111 BPM

## 2019-12-09 NOTE — TELEPHONE ENCOUNTER
Brenda, can you please call them & let them know that we will need the [facility] [employer] [facility] to send us a form, describing what procedure is planned. Thank you

## 2020-01-05 PROCEDURE — 93298 REM INTERROG DEV EVAL SCRMS: CPT | Performed by: INTERNAL MEDICINE

## 2020-01-05 PROCEDURE — G2066 INTER DEVC REMOTE 30D: HCPCS | Performed by: INTERNAL MEDICINE

## 2020-01-06 ENCOUNTER — CLINICAL SUPPORT (OUTPATIENT)
Dept: CARDIOLOGY | Facility: CLINIC | Age: 85
End: 2020-01-06

## 2020-01-06 DIAGNOSIS — I47.29 NONSUSTAINED VENTRICULAR TACHYCARDIA (HCC): ICD-10-CM

## 2020-01-06 DIAGNOSIS — R55 SYNCOPE AND COLLAPSE: ICD-10-CM

## 2020-03-07 PROCEDURE — 93298 REM INTERROG DEV EVAL SCRMS: CPT | Performed by: INTERNAL MEDICINE

## 2020-03-07 PROCEDURE — G2066 INTER DEVC REMOTE 30D: HCPCS | Performed by: INTERNAL MEDICINE

## 2020-03-09 ENCOUNTER — CLINICAL SUPPORT (OUTPATIENT)
Dept: CARDIOLOGY | Facility: CLINIC | Age: 85
End: 2020-03-09

## 2020-03-09 DIAGNOSIS — Z98.890 HISTORY OF LOOP RECORDER: ICD-10-CM

## 2020-03-09 DIAGNOSIS — R55 SYNCOPE AND COLLAPSE: ICD-10-CM

## 2020-03-09 DIAGNOSIS — I47.29 NONSUSTAINED VENTRICULAR TACHYCARDIA (HCC): ICD-10-CM

## 2020-04-09 ENCOUNTER — CLINICAL SUPPORT (OUTPATIENT)
Dept: CARDIOLOGY | Facility: CLINIC | Age: 85
End: 2020-04-09

## 2020-04-09 DIAGNOSIS — R55 SYNCOPE AND COLLAPSE: ICD-10-CM

## 2020-04-09 DIAGNOSIS — Z98.890 HISTORY OF LOOP RECORDER: ICD-10-CM

## 2020-04-09 DIAGNOSIS — I47.29 NONSUSTAINED VENTRICULAR TACHYCARDIA (HCC): ICD-10-CM

## 2020-04-09 PROCEDURE — 93298 REM INTERROG DEV EVAL SCRMS: CPT | Performed by: INTERNAL MEDICINE

## 2020-04-09 PROCEDURE — G2066 INTER DEVC REMOTE 30D: HCPCS | Performed by: INTERNAL MEDICINE

## 2020-05-05 ENCOUNTER — TELEMEDICINE (OUTPATIENT)
Dept: CARDIOLOGY | Facility: CLINIC | Age: 85
End: 2020-05-05

## 2020-05-05 DIAGNOSIS — I25.5 ISCHEMIC CARDIOMYOPATHY: ICD-10-CM

## 2020-05-05 DIAGNOSIS — E78.5 DYSLIPIDEMIA: ICD-10-CM

## 2020-05-05 DIAGNOSIS — I38 VALVULAR HEART DISEASE: ICD-10-CM

## 2020-05-05 DIAGNOSIS — I25.10 CORONARY ARTERY DISEASE INVOLVING NATIVE CORONARY ARTERY OF NATIVE HEART WITHOUT ANGINA PECTORIS: Primary | ICD-10-CM

## 2020-05-05 DIAGNOSIS — I10 ESSENTIAL HYPERTENSION: ICD-10-CM

## 2020-05-05 PROCEDURE — 99441 PR PHYS/QHP TELEPHONE EVALUATION 5-10 MIN: CPT | Performed by: INTERNAL MEDICINE

## 2020-05-05 RX ORDER — MELATONIN
1000 DAILY
COMMUNITY

## 2020-05-05 NOTE — PROGRESS NOTES
Subjective:     Encounter Date:05/05/2020      Patient ID: Evita David is a 89 y.o. female.    Chief Complaint:  Chief Complaint   Patient presents with   • Follow-up       HPI:  History of Present Illness       You have chosen to receive care through a telephone visit. Do you consent to use a telephone visit for your medical care today? Yes  I had a telephone visit with Ms. David via .  Patient does not speak English.  Purpose of the visit was ongoing care of her underlying coronary disease.  She is a 89-year-old female with a history of coronary artery disease.  She is status post previous coronary artery bypass grafting.  She has a history of essential hypertension, nonsustained ventricular tachycardia, ischemic cardiomyopathy (in December 2018, EF was 45 to 50%).  She has aortic insufficiency.  She has a loop recorder in place and interrogation reveals no recent nonsustained ventricular tachycardia.    Ms. David (via ) is not complaining of chest discomfort or shortness of air.  She denies palpitations.  She is not complaining of lower extremity edema and has not taken her Bumex in approximately 1/2 months.  She denies orthopnea or paroxysmal nocturnal dyspnea.  Her daughter states that she has been resting more over the past few months and has felt much better.  She had an echocardiogram in December 2019 which demonstrated an ejection fraction of 55% with mild OLEKSANDR.  Her left atrium was moderately dilated with increased left atrial volume.  She had mild to moderate aortic insufficiency and mild to moderate mitral insufficiency.  Mild tricuspid insufficiency.    The following portions of the patient's history were reviewed and updated as appropriate: allergies, current medications, past family history, past medical history, past social history, past surgical history and problem list.    Problem List:  Patient Active Problem List   Diagnosis   • CAD (coronary artery disease)   •  S/P CABG (coronary artery bypass graft)   • Syncope and collapse   • History of loop recorder   • Dyslipidemia   • Essential hypertension   • Nonsustained ventricular tachycardia (CMS/HCC)   • Ischemic cardiomyopathy   • Renal insufficiency   • Elevated liver enzymes   • Valvular heart disease       Past Medical History:  Past Medical History:   Diagnosis Date   • Anemia    • Arthritis    • Back pain    • Bone pain    • Cancer (CMS/HCC)     BREAST   • Coronary artery disease     2013: LAD with mild proximal disease. Mid with 50-60%. D1 with 70-80%. Circ calcified. OM2 with . Distal OM filled by collaterals: RCA not engaged. SVG to RCA patent with 50-60% distally. Attempt made for PCI to occluded circ , unsuccessful   • Dementia (CMS/HCC)    • Depression    • Dizziness    • Dry eyes    • High cholesterol    • History of echocardiogram 12/04/2019    LV wall thickness is c/w mild spetal asymmetric hypertrophy. Grade I diastolic dysfunction. LA cavity is moderately dilated. Mild calcification of aortic valve. Mild to moderate AR. Mild to moderate MR. Mild TR. Mild NE.    • Hypertension    • Indigestion    • Leg weakness, bilateral    • Loss of balance    • Loss of hearing    • Lung cancer (CMS/HCC)     POSS  F/U DR [place]   • Papillary adenocarcinoma of thyroid (CMS/HCC)    • Sleep apnea    • Stomach pain     RECENTLY   • UTI (urinary tract infection)     ON MED PRESENTLY   • Vaginal discharge        Past Surgical History:  Past Surgical History:   Procedure Laterality Date   • APPENDECTOMY     • BREAST EXCISIONAL BIOPSY Right    • CARDIAC CATHETERIZATION     • CARDIAC SURGERY      2002  UNSURE OF PROCEDURE [facility]   • CATARACT EXTRACTION Left    • CHOLECYSTECTOMY OPEN     • CORONARY ARTERY BYPASS GRAFT     • EYE SURGERY     • THYROIDECTOMY Right 7/21/2016    Procedure: RT THYROIDECTOMY  W/ F.S.;  Surgeon: Travis Segovia MD;  [place] [facility] [place] [facility] [place]  Service:        Social History:  Social History      Socioeconomic History   • Marital status:      Spouse name: Not on file   • Number of children: Not on file   • Years of education: Not on file   • Highest education level: Not on file   Tobacco Use   • Smoking status: Never Smoker   • Smokeless tobacco: Never Used   Substance and Sexual Activity   • Alcohol use: No   • Drug use: No   • Sexual activity: Defer       Allergies:  Allergies   Allergen Reactions   • Cephalexin Unknown (See Comments)     .       Immunizations:    There is no immunization history on file for this patient.    ROS:  Review of Systems   Constitution: Negative for chills, decreased appetite, fever, malaise/fatigue, weight gain and weight loss.   HENT: Negative for congestion, hoarse voice, nosebleeds and sore throat.    Eyes: Negative for blurred vision, double vision and visual disturbance.   Cardiovascular: Negative for chest pain, claudication, dyspnea on exertion, irregular heartbeat, leg swelling, near-syncope, orthopnea, palpitations, paroxysmal nocturnal dyspnea and syncope.   Respiratory: Negative for cough, hemoptysis, shortness of breath, sleep disturbances due to breathing, snoring, sputum production and wheezing.    Endocrine: Negative for cold intolerance, heat intolerance, polydipsia and polyuria.   Hematologic/Lymphatic: Negative for adenopathy and bleeding problem. Does not bruise/bleed easily.   Skin: Negative for flushing, itching, nail changes and rash.   Musculoskeletal: Negative for arthritis, back pain, joint pain, muscle cramps, muscle weakness, myalgias and neck pain.   Gastrointestinal: Negative for bloating, abdominal pain, anorexia, change in bowel habit, constipation, diarrhea, heartburn, hematemesis, hematochezia, jaundice, melena, nausea and vomiting.   Genitourinary: Negative for dysuria, hematuria and nocturia.   Neurological: Negative for brief paralysis, disturbances in coordination, excessive daytime sleepiness, dizziness, headaches,  light-headedness, loss of balance, numbness, paresthesias, seizures and vertigo.   Psychiatric/Behavioral: Negative for altered mental status and depression. The patient is not nervous/anxious.    Allergic/Immunologic: Negative for environmental allergies and hives.          Objective:         There were no vitals taken for this visit.    Physical Exam  Unable to perform physical examination secondary to telephone visit  In-Office Procedure(s):  Procedures    ASCVD RIsk Score::  The ASCVD Risk score ([place] BEST Jr., et al., 2013) failed to calculate for the following reasons:    The 2013 ASCVD risk score is only valid for ages 40 to 79    Recent Radiology:  Imaging Results (Most Recent)     None          Lab Review:   not applicable             Assessment:          Diagnosis Plan   1. Coronary artery disease involving native coronary artery of native heart without angina pectoris     2. Essential hypertension     3. Ischemic cardiomyopathy     4. Valvular heart disease     5. Dyslipidemia            Plan:   Mr. David is stable from a cardiac standpoint.  She is not complaining of symptoms of angina or heart failure.  No syncope.  She will follow-up in the office in 3 months    The length of time of the phone call was 6  minutes.  Documentation was 10  minutes    Level of Care:                 Gail Uriarte MD  05/05/20  .

## 2020-08-06 ENCOUNTER — OFFICE VISIT (OUTPATIENT)
Dept: CARDIOLOGY | Facility: CLINIC | Age: 85
End: 2020-08-06

## 2020-08-06 VITALS
HEIGHT: 60 IN | RESPIRATION RATE: 20 BRPM | DIASTOLIC BLOOD PRESSURE: 84 MMHG | OXYGEN SATURATION: 97 % | BODY MASS INDEX: 27.29 KG/M2 | SYSTOLIC BLOOD PRESSURE: 144 MMHG | HEART RATE: 77 BPM | WEIGHT: 139 LBS

## 2020-08-06 DIAGNOSIS — I38 VALVULAR HEART DISEASE: ICD-10-CM

## 2020-08-06 DIAGNOSIS — I47.29 NONSUSTAINED VENTRICULAR TACHYCARDIA (HCC): ICD-10-CM

## 2020-08-06 DIAGNOSIS — I10 ESSENTIAL HYPERTENSION: ICD-10-CM

## 2020-08-06 DIAGNOSIS — I25.10 CORONARY ARTERY DISEASE INVOLVING NATIVE CORONARY ARTERY OF NATIVE HEART WITHOUT ANGINA PECTORIS: Primary | ICD-10-CM

## 2020-08-06 DIAGNOSIS — I25.5 ISCHEMIC CARDIOMYOPATHY: ICD-10-CM

## 2020-08-06 DIAGNOSIS — E78.5 DYSLIPIDEMIA: ICD-10-CM

## 2020-08-06 PROCEDURE — 99214 OFFICE O/P EST MOD 30 MIN: CPT | Performed by: INTERNAL MEDICINE

## 2020-08-06 RX ORDER — LORATADINE 10 MG/1
1 CAPSULE, LIQUID FILLED ORAL DAILY
COMMUNITY
End: 2021-03-15 | Stop reason: ALTCHOICE

## 2020-08-06 RX ORDER — LEVOTHYROXINE SODIUM 0.05 MG/1
50 TABLET ORAL DAILY
COMMUNITY
End: 2022-09-15

## 2020-08-06 NOTE — PROGRESS NOTES
Subjective:     Encounter Date:08/06/2020      Patient ID: Evita David is a 89 y.o. female.    Chief Complaint:  Chief Complaint   Patient presents with   • Follow-up   • Chest Pain       HPI:  History of Present Illness     I saw Evita in the office today with  present.  She is a 89-year-old female with known coronary disease.  She is status post previous coronary artery bypass grafting.  She also has essential hypertension, history of nonsustained ventricular tachycardia, ischemic cardiomyopathy, valvular heart disease.  She has a loop recorder in place.    Mr. David states that she has been having some sharp chest discomfort.  She describes this as being over her loop recorder and states that if she pushes on her chest it would hurt.  She does not describe any symptoms to suggest angina.  She is not complaining of shortness of air.  She has occasional palpitations.  She has occasional lower extremity edema but she does not take her diuretic as prescribed.  She sleeps on one pillow and does not describe paroxysmal nocturnal dyspnea.  Her blood pressure is mildly elevated today.  She states that she does try to exercise approximately 30 minutes/day.  Obviously her exercises are not high intensity.    The following portions of the patient's history were reviewed and updated as appropriate: allergies, current medications, past family history, past medical history, past social history, past surgical history and problem list.    Problem List:  Patient Active Problem List   Diagnosis   • CAD (coronary artery disease)   • S/P CABG (coronary artery bypass graft)   • Syncope and collapse   • History of loop recorder   • Dyslipidemia   • Essential hypertension   • Nonsustained ventricular tachycardia (CMS/HCC)   • Ischemic cardiomyopathy   • Renal insufficiency   • Elevated liver enzymes   • Valvular heart disease       Past Medical History:  Past Medical History:   Diagnosis Date   • Anemia    •  Arthritis    • Back pain    • Bone pain    • Cancer (CMS/HCC)     BREAST   • Coronary artery disease     2013: LAD with mild proximal disease. Mid with 50-60%. D1 with 70-80%. Circ calcified. OM2 with . Distal OM filled by collaterals: RCA not engaged. SVG to RCA patent with 50-60% distally. Attempt made for PCI to occluded circ , unsuccessful   • Dementia (CMS/HCC)    • Depression    • Dizziness    • Dry eyes    • High cholesterol    • History of echocardiogram 12/04/2019    LV wall thickness is c/w mild spetal asymmetric hypertrophy. Grade I diastolic dysfunction. LA cavity is moderately dilated. Mild calcification of aortic valve. Mild to moderate AR. Mild to moderate MR. Mild TR. Mild NH.    • Hypertension    • Indigestion    • Leg weakness, bilateral    • Loss of balance    • Loss of hearing    • Lung cancer (CMS/HCC)     POSS  F/U DR [place]   • Papillary adenocarcinoma of thyroid (CMS/HCC)    • Sleep apnea    • Stomach pain     RECENTLY   • UTI (urinary tract infection)     ON MED PRESENTLY   • Vaginal discharge        Past Surgical History:  Past Surgical History:   Procedure Laterality Date   • APPENDECTOMY     • BREAST EXCISIONAL BIOPSY Right    • CARDIAC CATHETERIZATION     • CARDIAC SURGERY      2002  UNSURE OF PROC[facility]URE [facility]   • CATARACT EXTRACTION Left    • CHOLECYSTECTOMY OPEN     • CORONARY ARTERY BYPASS GRAFT     • EYE SURGERY     • THYROIDECTOMY Right 7/21/2016    Procedure: RT THYROIDECTOMY  W/ F.S.;  Surgeon: Travis Segovia MD;  Location: [facility] [facility] [place];  Service:        Social History:  Social History     Socioeconomic History   • Marital status:      Spouse name: Not on file   • Number of children: Not on file   • Years of education: Not on file   • Highest education level: Not on file   Tobacco Use   • Smoking status: Never Smoker   • Smokeless tobacco: Never Used   Substance and Sexual Activity   • Alcohol use: No   • Drug use: No   • Sexual activity: Defer        Allergies:  Allergies   Allergen Reactions   • Cephalexin Unknown (See Comments)     .       Immunizations:    There is no immunization history on file for this patient.    ROS:  Review of Systems   Constitution: Negative for chills, decreased appetite, fever, malaise/fatigue, weight gain and weight loss.   HENT: Negative for congestion, hoarse voice, nosebleeds and sore throat.    Eyes: Negative for blurred vision, double vision and visual disturbance.   Cardiovascular: Positive for chest pain, leg swelling and palpitations. Negative for claudication, dyspnea on exertion, irregular heartbeat, near-syncope, orthopnea, paroxysmal nocturnal dyspnea and syncope.   Respiratory: Negative for cough, hemoptysis, shortness of breath, sleep disturbances due to breathing, snoring, sputum production and wheezing.    Endocrine: Negative for cold intolerance, heat intolerance, polydipsia and polyuria.   Hematologic/Lymphatic: Negative for adenopathy and bleeding problem. Does not bruise/bleed easily.   Skin: Negative for flushing, itching, nail changes and rash.   Musculoskeletal: Negative for arthritis, back pain, joint pain, muscle cramps, muscle weakness, myalgias and neck pain.   Gastrointestinal: Negative for bloating, abdominal pain, anorexia, change in bowel habit, constipation, diarrhea, heartburn, hematemesis, hematochezia, jaundice, melena, nausea and vomiting.   Genitourinary: Negative for dysuria, hematuria and nocturia.   Neurological: Negative for brief paralysis, disturbances in coordination, excessive daytime sleepiness, dizziness, headaches, light-headedness, loss of balance, numbness, paresthesias, seizures and vertigo.   Psychiatric/Behavioral: Negative for altered mental status and depression. The patient is not nervous/anxious.    Allergic/Immunologic: Negative for environmental allergies and hives.          Objective:         /84 (BP Location: Left arm, Patient Position: Sitting, Cuff Size:  "Large Adult)   Pulse 77   Resp 20   Ht 152.4 cm (60\")   Wt 63 kg (139 lb)   SpO2 97%   BMI 27.15 kg/m²     Physical Exam   Constitutional: She is oriented to person, place, and time. She appears well-developed and well-nourished. No distress.   HENT:   Head: Normocephalic and atraumatic.   Mouth/Throat: Oropharynx is clear and moist.   Eyes: Pupils are equal, round, and reactive to light. Conjunctivae and EOM are normal. No scleral icterus.   Neck: Normal range of motion. Neck supple. No thyromegaly present.   Cardiovascular: Normal rate, regular rhythm, S1 normal, S2 normal and intact distal pulses.  No extrasystoles are present. PMI is not displaced. Exam reveals no gallop, no S3, no S4, no friction rub and no decreased pulses.   Murmur ( There is a 2/6 systolic murmur heard at the left lower sternal border and apex.  There is a 1/6 diastolic murmur heard at the mid left sternal border) heard.  Pulses:       Carotid pulses are 2+ on the right side, and 2+ on the left side.       Dorsalis pedis pulses are 2+ on the right side, and 2+ on the left side.        Posterior tibial pulses are 2+ on the right side, and 2+ on the left side.   Pulmonary/Chest: Effort normal and breath sounds normal. No respiratory distress. She has no wheezes. She has no rales.   Loop recorder is palpated on the left chest.  There is no erythema or drainage.   Abdominal: Soft. Bowel sounds are normal. She exhibits no distension and no mass. There is no tenderness. There is no rebound and no guarding.   Musculoskeletal: She exhibits edema (There is 1+ edema noted in the lower extremities bilaterally.).   Slow gait.  Walks with a cane   Lymphadenopathy:     She has no cervical adenopathy.   Neurological: She is alert and oriented to person, place, and time. Coordination normal.   Skin: Skin is warm and dry. No rash noted. She is not diaphoretic. No pallor.   Psychiatric: She has a normal mood and affect. Her behavior is normal.   Nursing " note and vitals reviewed.      In-Office Procedure(s):  Procedures    ASCVD RIsk Score::  The ASCVD Risk score ([place]aliyah BEST Jr., et al., 2013) failed to calculate for the following reasons:    The 2013 ASCVD risk score is only valid for ages 40 to 79    Recent Radiology:  Imaging Results (Most Recent)     None          Lab Review:   not applicable             Assessment:          Diagnosis Plan   1. Coronary artery disease involving native coronary artery of native heart without angina pectoris     2. Essential hypertension     3. Ischemic cardiomyopathy     4. Nonsustained ventricular tachycardia (CMS/HCC)     5. Valvular heart disease     6. Dyslipidemia            Plan:      1.  CAD  Evita does not describe symptoms of angina.  She discontinued her aspirin.  I am going to ask her to resume her aspirin 81 mg daily.  I have placed her on statins in the past but she is not currently taking them.  She is on Ranexa    2.  Essential hypertension  Her blood pressure is mildly elevated.  I have asked the family to continue to monitor this.  I rather see her blood pressure a little elevated rather than too low.  She has had problems in the past with orthostasis    3.  Ischemic cardiomyopathy  She had an echocardiogram in December 2019 with an ejection fraction of 55%.  There were wall motion abnormalities.  She is on metoprolol.    4.  Valvular heart disease  In December 2019, her echocardiogram demonstrated mild to moderate aortic and mitral insufficiency.    5.  Dyslipidemia  She has been on statins in the past but has not been intolerant of the medications  She had a lipid profile in February 2020.  Her LDL was 123, HDL 72, total cholesterol 209 and triglycerides 72.  Her LDL goal is less than 70      Level of Care:                 Gail Uriarte MD  08/06/20  .

## 2020-08-16 DIAGNOSIS — I25.5 ISCHEMIC CARDIOMYOPATHY: ICD-10-CM

## 2020-08-17 RX ORDER — METOPROLOL SUCCINATE 25 MG/1
25 TABLET, EXTENDED RELEASE ORAL DAILY
Qty: 90 TABLET | Refills: 1 | Status: SHIPPED | OUTPATIENT
Start: 2020-08-17 | End: 2021-03-04

## 2021-02-24 ENCOUNTER — TELEPHONE (OUTPATIENT)
Dept: CARDIOLOGY | Facility: CLINIC | Age: 86
End: 2021-02-24

## 2021-02-24 NOTE — TELEPHONE ENCOUNTER
Advised the daughter, that she needs to take her mother to the ER to be evaluated, there is nothing we can do in office to tell if she has had a stroke or to treat it. She needs an evaluation by the ER

## 2021-02-24 NOTE — TELEPHONE ENCOUNTER
CPA  Pt daughter called stating her mom was talking out of her head last Friday. Daughter said she went to PCP yesterday with left sided weakness but didn't go to hospital. They're concerned     Sonia [phone]

## 2021-03-04 DIAGNOSIS — I25.5 ISCHEMIC CARDIOMYOPATHY: ICD-10-CM

## 2021-03-04 RX ORDER — METOPROLOL SUCCINATE 25 MG/1
25 TABLET, EXTENDED RELEASE ORAL DAILY
Qty: 90 TABLET | Refills: 0 | Status: SHIPPED | OUTPATIENT
Start: 2021-03-04 | End: 2021-03-15 | Stop reason: ALTCHOICE

## 2021-03-13 PROBLEM — I25.5 ISCHEMIC CARDIOMYOPATHY: Chronic | Status: ACTIVE | Noted: 2019-07-24

## 2021-03-13 PROBLEM — I10 ESSENTIAL HYPERTENSION: Chronic | Status: ACTIVE | Noted: 2019-07-23

## 2021-03-13 PROBLEM — I38 VALVULAR HEART DISEASE: Chronic | Status: ACTIVE | Noted: 2019-11-25

## 2021-03-13 PROBLEM — Z98.890 HISTORY OF LOOP RECORDER: Chronic | Status: ACTIVE | Noted: 2019-07-23

## 2021-03-13 PROBLEM — I25.10 CAD (CORONARY ARTERY DISEASE): Chronic | Status: ACTIVE | Noted: 2019-07-23

## 2021-03-13 PROBLEM — E78.5 DYSLIPIDEMIA: Chronic | Status: ACTIVE | Noted: 2019-07-23

## 2021-03-15 ENCOUNTER — OFFICE VISIT (OUTPATIENT)
Dept: CARDIOLOGY | Facility: CLINIC | Age: 86
End: 2021-03-15

## 2021-03-15 VITALS
BODY MASS INDEX: 27.15 KG/M2 | SYSTOLIC BLOOD PRESSURE: 118 MMHG | HEART RATE: 60 BPM | HEIGHT: 60 IN | DIASTOLIC BLOOD PRESSURE: 66 MMHG

## 2021-03-15 DIAGNOSIS — Z98.890 HISTORY OF LOOP RECORDER: Chronic | ICD-10-CM

## 2021-03-15 DIAGNOSIS — E78.5 DYSLIPIDEMIA: Chronic | ICD-10-CM

## 2021-03-15 DIAGNOSIS — I25.5 ISCHEMIC CARDIOMYOPATHY: Chronic | ICD-10-CM

## 2021-03-15 DIAGNOSIS — I10 ESSENTIAL HYPERTENSION: Chronic | ICD-10-CM

## 2021-03-15 DIAGNOSIS — I25.10 CORONARY ARTERY DISEASE INVOLVING NATIVE CORONARY ARTERY OF NATIVE HEART WITHOUT ANGINA PECTORIS: Primary | Chronic | ICD-10-CM

## 2021-03-15 DIAGNOSIS — I38 VALVULAR HEART DISEASE: Chronic | ICD-10-CM

## 2021-03-15 PROCEDURE — 93298 REM INTERROG DEV EVAL SCRMS: CPT | Performed by: INTERNAL MEDICINE

## 2021-03-15 PROCEDURE — 99214 OFFICE O/P EST MOD 30 MIN: CPT | Performed by: INTERNAL MEDICINE

## 2021-03-15 RX ORDER — ASPIRIN 81 MG/1
81 TABLET ORAL DAILY
COMMUNITY
Start: 2021-02-27 | End: 2021-03-29

## 2021-03-15 RX ORDER — LISINOPRIL 40 MG/1
40 TABLET ORAL DAILY
Qty: 30 TABLET | Refills: 6 | Status: SHIPPED | OUTPATIENT
Start: 2021-03-15 | End: 2021-12-15 | Stop reason: SDUPTHER

## 2021-03-15 RX ORDER — ROSUVASTATIN CALCIUM 20 MG/1
20 TABLET, COATED ORAL DAILY
COMMUNITY
Start: 2021-02-27 | End: 2021-03-29

## 2021-03-15 RX ORDER — FEXOFENADINE HCL 180 MG/1
180 TABLET ORAL DAILY
COMMUNITY
Start: 2021-02-10 | End: 2022-09-15

## 2021-03-15 RX ORDER — LISINOPRIL 20 MG/1
20 TABLET ORAL DAILY
COMMUNITY
Start: 2021-02-27 | End: 2021-03-15 | Stop reason: DRUGHIGH

## 2021-03-15 RX ORDER — METOPROLOL SUCCINATE 25 MG/1
25-50 TABLET, EXTENDED RELEASE ORAL 2 TIMES DAILY
COMMUNITY
Start: 2021-03-08 | End: 2021-06-01

## 2021-03-15 NOTE — PROGRESS NOTES
Chief Complaint  No chief complaint on file.    Subjective    History of Present Illness      I saw Evita in the office today with  present.  She is a 89-year-old female with known coronary disease.  She is status post previous coronary artery bypass grafting.  She also has essential hypertension, history of nonsustained ventricular tachycardia, ischemic cardiomyopathy, valvular heart disease.  She has a loop recorder in place.  She was admitted to [facility] in February 2021 with hypertensive urgency with neurological changes.  CT scan of her head demonstrated no intracranial bleed and MRI revealed no stroke.  She was placed on lisinopril during her hospital stay.  She was also noted to have a pulmonary embolus.  Lower extremity DVT was negative.  She was initiated on heparin and then transitioned to Eliquis at discharge.    In the office today, she brings in a list of her blood pressure readings.  The majority of her blood pressure readings are significantly elevated between 150 and 180 mmHg.  She does not complain of neurological issues.  She has some vague intermittent chest discomfort but no symptoms to suggest angina.  She does not complain of shortness of air.  Intermittent lower extremity edema.  No palpitations.  She does state that she is compliant with her Eliquis    Objective   Vital Signs:   There were no vitals taken for this visit.    Vitals and nursing note reviewed.   Constitutional:       General: Not in acute distress.     Appearance: Well-developed. Not diaphoretic.      Comments: Elderly female, no acute distress   Eyes:      General: No scleral icterus.     Conjunctiva/sclera: Conjunctivae normal.      Pupils: Pupils are equal, round, and reactive to light.   HENT:      Head: Normocephalic and atraumatic.   Neck:      Thyroid: No thyromegaly.      Lymphadenopathy: No cervical adenopathy.   Pulmonary:      Effort: Pulmonary effort is normal. No respiratory distress.      Breath  sounds: Normal breath sounds. No wheezing. No rales.   Chest:      Comments: Loop recorder in place  Cardiovascular:      PMI at left midclavicular line. Normal rate. Regular rhythm. Normal S1. Normal S2.      Murmurs:  There is a 2/6 systolic murmur at the left lower sternal border and apex.  There is a 1/6 diastolic murmur at the mid left sternal border.      No gallop. No S3 and S4 gallop. No click. No rub.   Pulses:     Intact distal pulses. No decreased pulses.   Edema:     Peripheral edema ( Trivial lower extremity edema) present.  Abdominal:      General: Bowel sounds are normal. There is no distension.      Palpations: Abdomen is soft. There is no abdominal mass.      Tenderness: There is no abdominal tenderness. There is no guarding or rebound.   Musculoskeletal:      Cervical back: Normal range of motion and neck supple.      Comments: In a wheelchair Skin:     General: Skin is warm and dry.      Coloration: Skin is not pale.      Findings: No rash.   Neurological:      Mental Status: Alert and oriented to person, place, and time.      Coordination: Coordination normal.   Psychiatric:         Behavior: Behavior normal.         Result Review :   The following data was reviewed by: Gail Uriarte MD on 03/15/2021:  CMP    CMP 8/17/20   Glucose 109 (A)   BUN 30 (A)   Creatinine 1.3   Sodium 138   Potassium 4.7   Chloride 101   Calcium 9.8   Albumin 4.1   Total Bilirubin 0.4   Alkaline Phosphatase 73   AST (SGOT) 18   ALT (SGPT) 8 (A)   (A) Abnormal value            CBC w/diff    CBC w/Diff 2/24/21 2/25/21 2/25/21 2/26/21     0033 1130    WBC 8.72 10.84 10.42 10.06   RBC 3.90 (A) 4.34 4.05 3.85 (A)   Hemoglobin 12.0 13.6 12.7 12.1   Hematocrit 37.6 40.9 38.1 36.2   MCV 96.4 94.2 94.1 94.0   MCH 30.8 31.3 31.4 31.4   MCHC 31.9 33.3 33.3 33.4   RDW 14.0 13.9 14.2 14.3   Platelets 292 355 333 328   Neutrophil Rel % 71.2 70.2 69.5 64.0   Immature Granulocyte Rel % 1.3 (A) 0.9 1.2 (A) 1.6 (A)   Lymphocyte Rel %  17.7 19.3 17.3 22.4   Monocyte Rel % 7.8 7.9 10.2 9.7   Eosinophil Rel % 1.4 1.1 1.2 1.6   Basophil Rel % 0.6 0.6 0.6 0.7   (A) Abnormal value                TSH    TSH 8/17/20 2/25/21   TSH 2.450 2.160      Comments are available for some flowsheets but are not being displayed.                     Assessment and Plan    1. Coronary artery disease involving native coronary artery of native heart without angina pectoris  2008: CABG: LIMA to LAD, saphenous vein graft to D1, OM1 and RCA  2013: Cath: LAD 60 to 70%, D1 90%, circumflex 90% and %.  LIMA to the LAD was occluded, saphenous vein graft to D1 and OM1 was occluded.  Saphenous vein graft to RCA was patent.  2013: Elective PCI of the left circumflex was abandoned secondary to inability to pass the wire past the lesion  2017: Nuclear: Prior inferior and inferolateral wall infarction with minimal jerry-infarction ischemia  She is on ranolazine, metoprolol, Imdur and aspirin.  No symptoms of angina    2. Essential hypertension  Recent admission to  due to hypertensive urgency.  I have increased her lisinopril to 40 mg daily.  She will need to monitor her blood pressure and a repeat BMP will need to be obtained and 2 to 3 weeks.    3. Ischemic cardiomyopathy  She had an echocardiogram on 2/25/2021 which demonstrated an ejection fraction of 30 to 35% with moderate asymmetric septal hypertrophy.  She had grade 1 diastolic dysfunction, moderate aortic insufficiency, mild to moderate mitral insufficiency and mild tricuspid insufficiency    4. Valvular heart disease  Echo in February 2021 revealed mild mitral insufficiency, moderate aortic insufficiency and mild to moderate mitral insufficiency.    5. Dyslipidemia  Lipid profile from February 25, 2021 revealed triglycerides of 120, total cholesterol 261, HDL 54, .  She is on rosuvastatin 20 mg daily    6. History of loop recorder  Interrogation of her device revealed no arrhythmias    7.   Pulmonary embolus  Dopplers of lower extremities were negative.  She was placed on heparin and transitioned to Eliquis.  She is compliant with Eliquis.  I did ask her to discontinue her aspirin while on Eliquis due to increased bleeding risk.        Follow Up   No follow-ups on file.  Patient was given instructions and counseling regarding her condition or for health maintenance advice. Please see specific information pulled into the AVS if appropriate.

## 2021-06-01 DIAGNOSIS — I25.5 ISCHEMIC CARDIOMYOPATHY: Primary | ICD-10-CM

## 2021-06-01 RX ORDER — METOPROLOL SUCCINATE 25 MG/1
TABLET, EXTENDED RELEASE ORAL
Qty: 90 TABLET | Refills: 1 | Status: SHIPPED | OUTPATIENT
Start: 2021-06-01 | End: 2021-08-31 | Stop reason: SDUPTHER

## 2021-08-31 ENCOUNTER — OFFICE VISIT (OUTPATIENT)
Dept: CARDIOLOGY | Facility: CLINIC | Age: 86
End: 2021-08-31

## 2021-08-31 VITALS
DIASTOLIC BLOOD PRESSURE: 72 MMHG | WEIGHT: 133 LBS | BODY MASS INDEX: 26.11 KG/M2 | HEIGHT: 60 IN | HEART RATE: 61 BPM | SYSTOLIC BLOOD PRESSURE: 124 MMHG

## 2021-08-31 DIAGNOSIS — I25.5 ISCHEMIC CARDIOMYOPATHY: Primary | ICD-10-CM

## 2021-08-31 DIAGNOSIS — I25.10 CORONARY ARTERY DISEASE INVOLVING NATIVE CORONARY ARTERY OF NATIVE HEART WITHOUT ANGINA PECTORIS: ICD-10-CM

## 2021-08-31 DIAGNOSIS — I10 ESSENTIAL HYPERTENSION: Chronic | ICD-10-CM

## 2021-08-31 DIAGNOSIS — Z98.890 HISTORY OF LOOP RECORDER: Chronic | ICD-10-CM

## 2021-08-31 DIAGNOSIS — I38 VALVULAR HEART DISEASE: Chronic | ICD-10-CM

## 2021-08-31 DIAGNOSIS — R55 SYNCOPE AND COLLAPSE: Chronic | ICD-10-CM

## 2021-08-31 PROCEDURE — 99215 OFFICE O/P EST HI 40 MIN: CPT | Performed by: INTERNAL MEDICINE

## 2021-08-31 PROCEDURE — G2212 PROLONG OUTPT/OFFICE VIS: HCPCS | Performed by: INTERNAL MEDICINE

## 2021-08-31 PROCEDURE — 93291 INTERROG DEV EVAL SCRMS IP: CPT | Performed by: INTERNAL MEDICINE

## 2021-08-31 RX ORDER — BUMETANIDE 1 MG/1
1 TABLET ORAL DAILY
Qty: 90 TABLET | Refills: 2 | Status: SHIPPED | OUTPATIENT
Start: 2021-08-31

## 2021-08-31 RX ORDER — NITROGLYCERIN 0.4 MG/1
0.4 TABLET SUBLINGUAL
Qty: 25 TABLET | Refills: 3 | Status: SHIPPED | OUTPATIENT
Start: 2021-08-31

## 2021-08-31 RX ORDER — BUMETANIDE 1 MG/1
1 TABLET ORAL DAILY
Status: SHIPPED | COMMUNITY
Start: 2021-08-31 | End: 2021-08-31 | Stop reason: SDUPTHER

## 2021-08-31 RX ORDER — METOPROLOL SUCCINATE 25 MG/1
25 TABLET, EXTENDED RELEASE ORAL DAILY
Qty: 90 TABLET | Refills: 1 | Status: SHIPPED | OUTPATIENT
Start: 2021-08-31 | End: 2021-12-15 | Stop reason: SDUPTHER

## 2021-08-31 RX ORDER — ROSUVASTATIN CALCIUM 20 MG/1
20 TABLET, COATED ORAL DAILY
COMMUNITY
Start: 2021-04-02

## 2021-08-31 NOTE — PROGRESS NOTES
Chief Complaint  Coronary Artery Disease and Cardiomyopathy    Subjective    History of Present Illness      I saw Evita in the office today with  present.  She is a 89-year-old female with known coronary disease.  She is status post previous coronary artery bypass grafting.  She also has essential hypertension, history of nonsustained ventricular tachycardia, ischemic cardiomyopathy, valvular heart disease.  She has a loop recorder in place.  She was admitted to [facility] in February 2021 with hypertensive urgency with neurological changes.  CT scan of her head demonstrated no intracranial bleed and MRI revealed no stroke.  She was placed on lisinopril during her hospital stay.  She was also noted to have a pulmonary embolus.  Lower extremity DVT was negative.  She was initiated on heparin and then transitioned to Eliquis at discharge.     Since her previous visit, she has had a CT angiogram of the chest which demonstrated no evidence of dissection or aneurysm.  There was moderate atherosclerosis.  There is a small filling defect in the posterior branch of the right lower lobe of the pulmonary artery consistent with pulmonary embolism.  There is a persistent irregular shaped noncalcified masslike region of consolidation involving the right upper lobe measuring up to 4 cm.  This is going to be followed by pulmonary.  She had an echocardiogram which demonstrated an ejection fraction of 28%.  Visually this appeared to be 30 to 35%.  There is moderate asymmetric septal hypertrophy.  Grade 1 diastolic dysfunction.  Moderate aortic insufficiency.  Mild to moderate mitral regurgitation and mild tricuspid regurgitation.    In the office today, she is complaining that one of the medications that she takes twice a day is causing her to sneeze.  That is primarily when she is focused on.  I have tried to explain to  that Ranexa and apixaban (the drops that she takes twice a day) do not cause  "sneezing.  She is not complaining of chest discomfort other than a sharp knifelike discomfort with a duration of 1 to 2 seconds occurring unpredictably.  She is not short of air but she is fairly sedentary.  No complaints of palpitations.  She did have a syncopal episode in June 2021.  She did visit the emergency department and no specific etiology was determined.  Interrogation of her loop recorder reveals no episodes of pauses or arrhythmias.    Objective   Vital Signs:   /72   Pulse 61   Ht 152.4 cm (60\")   Wt 60.3 kg (133 lb)   BMI 25.97 kg/m²     Vitals and nursing note reviewed.   Constitutional:       General: Not in acute distress.     Appearance: Well-developed and not in distress. Not diaphoretic.      Comments: Elderly female, no acute distress   Eyes:      General: No scleral icterus.     Conjunctiva/sclera: Conjunctivae normal.      Pupils: Pupils are equal, round, and reactive to light.   HENT:      Head: Normocephalic and atraumatic.   Neck:      Thyroid: No thyromegaly.      Lymphadenopathy: No cervical adenopathy.   Pulmonary:      Effort: Pulmonary effort is normal. No respiratory distress.      Breath sounds: Normal breath sounds. No wheezing. No rales.   Chest:      Comments: Loop recorder in place  Cardiovascular:      PMI at left midclavicular line. Normal rate. Regular rhythm. Normal S1. Normal S2.      Murmurs:  There is a 2/6 systolic murmur at the left lower sternal border and apex.  There is a 1/6 diastolic murmur at the mid left sternal border.      No gallop. No S3 and S4 gallop. No click. No rub.   Pulses:     Intact distal pulses. No decreased pulses.   Edema:     Peripheral edema (Mild bilateral lower extremity edema ) present.  Abdominal:      General: Bowel sounds are normal. There is no distension.      Palpations: Abdomen is soft. There is no abdominal mass.      Tenderness: There is no abdominal tenderness. There is no guarding or rebound.   Musculoskeletal:      " Cervical back: Normal range of motion and neck supple.      Comments: In a wheelchair Skin:     General: Skin is warm and dry.      Coloration: Skin is not pale.      Findings: No rash.   Neurological:      Mental Status: Alert and oriented to person, place, and time.      Coordination: Coordination normal.   Psychiatric:         Behavior: Behavior normal.         Result Review :   The following data was reviewed by: Gail Uriarte MD on 08/31/2021:  CMP    CMP 4/19/21   Glucose 99   BUN 13   Creatinine 1.2   Sodium 133 (A)   Potassium 5.1   Chloride 98   Calcium 9.5   (A) Abnormal value            BMP    BMP 4/19/21   Glucose 99   BUN 13   Creatinine 1.2   Sodium 133 (A)   Potassium 5.1   Chloride 98   CO2 28   Calcium 9.5   (A) Abnormal value          A CMP in June 2021 revealed a creatinine of 1.48            Assessment and Plan    1. Coronary artery disease involving native coronary artery of native heart without angina pectoris  No symptoms of angina.  She is on Imdur, metoprolol, ranolazine.    2. Ischemic cardiomyopathy  Recent echocardiogram with ejection fraction of 30 to 35%.  I did discuss an ICD with her today in the office (with ), she does not wish to have an ICD placed.  Nor does she wish to be resuscitated should she go into cardiac arrest.  Her daughter was present during discussion  - metoprolol succinate XL (TOPROL-XL) 25 MG 24 hr tablet; Take 1 tablet by mouth Daily.  Dispense: 90 tablet; Refill: 1  - bumetanide (BUMEX) 1 MG tablet; Take 1 tablet by mouth Daily.  Dispense: 90 tablet; Refill: 2    3. Essential hypertension  Controlled.  I have decreased her lisinopril to 20 mg daily in view of her elevated creatinine    4. Valvular heart disease  Moderate aortic insufficiency.  No clinical evidence of heart failure    5. History of loop recorder  Interrogation of her loop recorder revealed no significant arrhythmias.    6. Syncope and collapse  The etiology of her syncope is not  clear.  Her creatinine was elevated.  She has had multiple syncopal episodes over the years with no clear cardiac etiology    Ms. David has multiple issues.  Her angina pattern appears to be stable.  Her ejection fraction is reduced.  She is on afterload reduction with lisinopril.  She is on preload reduction with Imdur.  I have instructed her that she needs to take her Bumex on a routine basis rather than as needed.  She rarely takes the medication.  She is on metoprolol.  I have decreased her Ranexa to 250 mg twice daily.  I do not think that any of her current medications are causing her to sneeze in the morning.  This may be related to allergies.  She will discuss with her primary care physician  I did discuss with the patient and her daughter that her creatinine is now elevated and she is obviously elderly.  May want to consider decreasing Eliquis to 2.5 mg twice daily.  She will discuss with her primary care provider and pulmonologist      I spent 79 minutes caring for Evita on this date of service. This time includes time spent by me in the following activities:preparing for the visit, reviewing tests, obtaining and/or reviewing a separately obtained history, performing a medically appropriate examination and/or evaluation , counseling and educating the patient/family/caregiver, ordering medications, tests, or procedures, documenting information in the medical record, independently interpreting results and communicating that information with the patient/family/caregiver and care coordination  Follow Up   Return in about 3 months (around 11/30/2021).  Patient was given instructions and counseling regarding her condition or for health maintenance advice. Please see specific information pulled into the AVS if appropriate.

## 2021-09-20 ENCOUNTER — TELEPHONE (OUTPATIENT)
Dept: CARDIOLOGY | Facility: CLINIC | Age: 86
End: 2021-09-20

## 2021-09-20 ENCOUNTER — CLINICAL SUPPORT (OUTPATIENT)
Dept: CARDIOLOGY | Facility: CLINIC | Age: 86
End: 2021-09-20

## 2021-09-20 NOTE — TELEPHONE ENCOUNTER
CPA pt    Patient's daughter called, states patient has decided to move forward with ICD placement. Would like to know what the next step is? Would also like to know if she can increase Bumex, says her left foot swelling has not gone away?      # [phone]  Sarita

## 2021-09-20 NOTE — TELEPHONE ENCOUNTER
Can you check with Aleksandra and see if he wants to just put her on his schedule or see them first. Also if she is having swelling in one leg and not the other, that is not due to her heart.

## 2021-09-24 DIAGNOSIS — Z01.818 PREOP TESTING: Primary | ICD-10-CM

## 2021-09-24 DIAGNOSIS — Z01.818 OTHER SPECIFIED PRE-OPERATIVE EXAMINATION: Primary | ICD-10-CM

## 2021-09-24 DIAGNOSIS — I25.5 ISCHEMIC CARDIOMYOPATHY: Primary | ICD-10-CM

## 2021-09-24 DIAGNOSIS — I47.29 NONSUSTAINED VENTRICULAR TACHYCARDIA (HCC): ICD-10-CM

## 2021-09-24 DIAGNOSIS — I25.5 ISCHEMIC CARDIOMYOPATHY: ICD-10-CM

## 2021-09-27 ENCOUNTER — LAB (OUTPATIENT)
Dept: LAB | Facility: HOSPITAL | Age: 86
End: 2021-09-27

## 2021-09-27 DIAGNOSIS — Z01.818 PREOP TESTING: ICD-10-CM

## 2021-09-27 DIAGNOSIS — I47.29 NONSUSTAINED VENTRICULAR TACHYCARDIA (HCC): ICD-10-CM

## 2021-09-27 LAB
ALBUMIN SERPL-MCNC: 3.8 G/DL (ref 3.5–5.2)
ALBUMIN/GLOB SERPL: 1.7 G/DL
ALP SERPL-CCNC: 65 U/L (ref 39–117)
ALT SERPL W P-5'-P-CCNC: 9 U/L (ref 1–33)
ANION GAP SERPL CALCULATED.3IONS-SCNC: 7.3 MMOL/L (ref 5–15)
AST SERPL-CCNC: 15 U/L (ref 1–32)
BASOPHILS # BLD AUTO: 0.06 10*3/MM3 (ref 0–0.2)
BASOPHILS NFR BLD AUTO: 0.8 % (ref 0–1.5)
BILIRUB SERPL-MCNC: 0.3 MG/DL (ref 0–1.2)
BUN SERPL-MCNC: 24 MG/DL (ref 8–23)
BUN/CREAT SERPL: 17.5 (ref 7–25)
CALCIUM SPEC-SCNC: 9.1 MG/DL (ref 8.2–9.6)
CHLORIDE SERPL-SCNC: 100 MMOL/L (ref 98–107)
CO2 SERPL-SCNC: 30.7 MMOL/L (ref 22–29)
CREAT SERPL-MCNC: 1.37 MG/DL (ref 0.57–1)
DEPRECATED RDW RBC AUTO: 46.5 FL (ref 37–54)
EOSINOPHIL # BLD AUTO: 0.14 10*3/MM3 (ref 0–0.4)
EOSINOPHIL NFR BLD AUTO: 1.9 % (ref 0.3–6.2)
ERYTHROCYTE [DISTWIDTH] IN BLOOD BY AUTOMATED COUNT: 13.2 % (ref 12.3–15.4)
GFR SERPL CREATININE-BSD FRML MDRD: 36 ML/MIN/1.73
GFR SERPL CREATININE-BSD FRML MDRD: 44 ML/MIN/1.73
GLOBULIN UR ELPH-MCNC: 2.2 GM/DL
GLUCOSE SERPL-MCNC: 103 MG/DL (ref 65–99)
HCT VFR BLD AUTO: 37.4 % (ref 34–46.6)
HGB BLD-MCNC: 11.8 G/DL (ref 12–15.9)
IMM GRANULOCYTES # BLD AUTO: 0.03 10*3/MM3 (ref 0–0.05)
IMM GRANULOCYTES NFR BLD AUTO: 0.4 % (ref 0–0.5)
INR PPP: 1.26 (ref 0.9–1.1)
LYMPHOCYTES # BLD AUTO: 1.94 10*3/MM3 (ref 0.7–3.1)
LYMPHOCYTES NFR BLD AUTO: 26.4 % (ref 19.6–45.3)
MCH RBC QN AUTO: 30 PG (ref 26.6–33)
MCHC RBC AUTO-ENTMCNC: 31.6 G/DL (ref 31.5–35.7)
MCV RBC AUTO: 95.2 FL (ref 79–97)
MONOCYTES # BLD AUTO: 0.87 10*3/MM3 (ref 0.1–0.9)
MONOCYTES NFR BLD AUTO: 11.9 % (ref 5–12)
NEUTROPHILS NFR BLD AUTO: 4.3 10*3/MM3 (ref 1.7–7)
NEUTROPHILS NFR BLD AUTO: 58.6 % (ref 42.7–76)
NRBC BLD AUTO-RTO: 0 /100 WBC (ref 0–0.2)
PLATELET # BLD AUTO: 320 10*3/MM3 (ref 140–450)
PMV BLD AUTO: 11.9 FL (ref 6–12)
POTASSIUM SERPL-SCNC: 4.7 MMOL/L (ref 3.5–5.2)
PROT SERPL-MCNC: 6 G/DL (ref 6–8.5)
PROTHROMBIN TIME: 15.6 SECONDS (ref 11.7–14.2)
RBC # BLD AUTO: 3.93 10*6/MM3 (ref 3.77–5.28)
SARS-COV-2 ORF1AB RESP QL NAA+PROBE: NOT DETECTED
SODIUM SERPL-SCNC: 138 MMOL/L (ref 136–145)
WBC # BLD AUTO: 7.34 10*3/MM3 (ref 3.4–10.8)

## 2021-09-27 PROCEDURE — 85025 COMPLETE CBC W/AUTO DIFF WBC: CPT

## 2021-09-27 PROCEDURE — 36415 COLL VENOUS BLD VENIPUNCTURE: CPT

## 2021-09-27 PROCEDURE — C9803 HOPD COVID-19 SPEC COLLECT: HCPCS

## 2021-09-27 PROCEDURE — 80053 COMPREHEN METABOLIC PANEL: CPT

## 2021-09-27 PROCEDURE — 85610 PROTHROMBIN TIME: CPT

## 2021-09-27 PROCEDURE — U0004 COV-19 TEST NON-CDC HGH THRU: HCPCS

## 2021-09-29 ENCOUNTER — TELEPHONE (OUTPATIENT)
Dept: CARDIOLOGY | Facility: CLINIC | Age: 86
End: 2021-09-29

## 2021-09-29 ENCOUNTER — HOSPITAL ENCOUNTER (OUTPATIENT)
Facility: HOSPITAL | Age: 86
Setting detail: HOSPITAL OUTPATIENT SURGERY
Discharge: HOME | End: 2021-09-29
Attending: INTERNAL MEDICINE | Admitting: INTERNAL MEDICINE

## 2021-09-29 VITALS — BODY MASS INDEX: 23.75 KG/M2 | HEIGHT: 60 IN | WEIGHT: 121 LBS

## 2021-09-29 DIAGNOSIS — I25.5 ISCHEMIC CARDIOMYOPATHY: ICD-10-CM

## 2021-09-29 RX ORDER — SODIUM CHLORIDE 9 MG/ML
75 INJECTION, SOLUTION INTRAVENOUS CONTINUOUS
Status: DISCONTINUED | OUTPATIENT
Start: 2021-09-29 | End: 2021-09-29 | Stop reason: HOSPADM

## 2021-09-29 RX ORDER — SODIUM CHLORIDE 0.9 % (FLUSH) 0.9 %
10 SYRINGE (ML) INJECTION AS NEEDED
Status: DISCONTINUED | OUTPATIENT
Start: 2021-09-29 | End: 2021-09-29 | Stop reason: HOSPADM

## 2021-09-29 RX ORDER — VANCOMYCIN HYDROCHLORIDE 1 G/200ML
1000 INJECTION, SOLUTION INTRAVENOUS ONCE
Status: DISCONTINUED | OUTPATIENT
Start: 2021-09-29 | End: 2021-09-29 | Stop reason: HOSPADM

## 2021-09-29 RX ORDER — SODIUM CHLORIDE 0.9 % (FLUSH) 0.9 %
3 SYRINGE (ML) INJECTION EVERY 12 HOURS SCHEDULED
Status: DISCONTINUED | OUTPATIENT
Start: 2021-09-29 | End: 2021-09-29 | Stop reason: HOSPADM

## 2021-09-29 NOTE — TELEPHONE ENCOUNTER
CPA pt    Patient's daughter called, states patient was scheduled for ICD battey change out today. Patient decided she did not want to go through with procedure. Patient's daughter would like to know if patient should be on any restrictions due to her heart. Would also like to know if EF is going to drop again. Patient has appointment in December for follow up with Dr. Uriarte would like to know if she can push appointment out?    # [phone]

## 2021-10-07 ENCOUNTER — OFFICE VISIT (OUTPATIENT)
Dept: CARDIOLOGY | Facility: CLINIC | Age: 86
End: 2021-10-07

## 2021-10-07 VITALS
HEIGHT: 60 IN | WEIGHT: 120 LBS | SYSTOLIC BLOOD PRESSURE: 140 MMHG | DIASTOLIC BLOOD PRESSURE: 72 MMHG | BODY MASS INDEX: 23.56 KG/M2 | HEART RATE: 62 BPM | RESPIRATION RATE: 18 BRPM

## 2021-10-07 DIAGNOSIS — I25.10 CORONARY ARTERY DISEASE INVOLVING NATIVE CORONARY ARTERY OF NATIVE HEART WITHOUT ANGINA PECTORIS: Primary | Chronic | ICD-10-CM

## 2021-10-07 DIAGNOSIS — Z95.1 S/P CABG (CORONARY ARTERY BYPASS GRAFT): ICD-10-CM

## 2021-10-07 DIAGNOSIS — I25.5 ISCHEMIC CARDIOMYOPATHY: Chronic | ICD-10-CM

## 2021-10-07 DIAGNOSIS — I38 VALVULAR HEART DISEASE: Chronic | ICD-10-CM

## 2021-10-07 PROCEDURE — 99213 OFFICE O/P EST LOW 20 MIN: CPT | Performed by: INTERNAL MEDICINE

## 2021-10-09 NOTE — PROGRESS NOTES
Subjective:     Encounter Date:10/07/2021      Patient ID: Evita Bautista is a 89 y.o. female.    Chief Complaint:  Chief Complaint   Patient presents with   • Cardiomyopathy       HPI:  Ms bautista is a 89 yo patient of Dr. Gail Uriarte who presents to discuss possible ICD placement.  Her past medical history is significant for HTN, HLD, CAD with a prior CABG and ischemic cardiomyopathy.  She had an echo done 2/21 which showed an EF of around 30% which is down from a prior echo 12/2018 where the EF was 45%.  She had a syncopal episode in 6/21 with no etiology identified.  Her loop recorder did not show any significant arrhythmia.        The following portions of the patient's history were reviewed and updated as appropriate: allergies, current medications, past family history, past medical history, past social history, past surgical history and problem list.    Problem List:  Patient Active Problem List   Diagnosis   • CAD (coronary artery disease)   • S/P CABG (coronary artery bypass graft)   • Syncope and collapse   • History of loop recorder   • Dyslipidemia   • Essential hypertension   • Nonsustained ventricular tachycardia (HCC)   • Ischemic cardiomyopathy   • Renal insufficiency   • Elevated liver enzymes   • Valvular heart disease       Past Medical History:  Past Medical History:   Diagnosis Date   • Anemia    • Arthritis    • Back pain    • Bone pain    • Cancer (HCC)     BREAST   • Coronary artery disease     2013: LAD with mild proximal disease. Mid with 50-60%. D1 with 70-80%. Circ calcified. OM2 with . Distal OM filled by collaterals: RCA not engaged. SVG to RCA patent with 50-60% distally. Attempt made for PCI to occluded circ , unsuccessful   • Dementia (HCC)    • Depression    • Dizziness    • Dry eyes    • High cholesterol    • History of echocardiogram 12/04/2019    LV wall thickness is c/w mild spetal asymmetric hypertrophy. Grade I diastolic dysfunction. LA cavity is moderately  No "dilated. Mild calcification of aortic valve. Mild to moderate AR. Mild to moderate MR. Mild TR. Mild MO.    • Hypertension    • Indigestion    • Leg weakness, bilateral    • Loss of balance    • Loss of hearing    • Lung cancer (HCC)     POSS  F/U DR [place]   • Papillary adenocarcinoma of thyroid (HCC)    • Sleep apnea    • Stomach pain     RECENTLY   • UTI (urinary tract infection)     ON MED PRESENTLY   • Vaginal discharge        Past Surgical History:  Past Surgical History:   Procedure Laterality Date   • APPENDECTOMY     • BREAST EXCISIONAL BIOPSY Right    • CARDIAC CATHETERIZATION     • CARDIAC SURGERY      2002  UNSURE OF PROCEDURE [facility]   • CATARACT EXTRACTION Left    • CHOLECYSTECTOMY OPEN     • CORONARY ARTERY BYPASS GRAFT     • EYE SURGERY     • THYROIDECTOMY Right 7/21/2016    Procedure: RT THYROIDECTOMY  W/ F.S.;  Surgeon: Travis Segovia MD;  Location: [facility] [place];  Service:        Social History:  Social History     Socioeconomic History   • Marital status:    Tobacco Use   • Smoking status: Never Smoker   • Smokeless tobacco: Never Used   Substance and Sexual Activity   • Alcohol use: No   • Drug use: No   • Sexual activity: Defer       Allergies:  Allergies   Allergen Reactions   • Cephalexin Unknown (See Comments)     .       Immunizations:  There is no immunization history for the selected administration types on file for this patient.    ROS:  Review of Systems   Constitutional: Positive for malaise/fatigue.   Cardiovascular: Negative for chest pain, dyspnea on exertion, irregular heartbeat, near-syncope, palpitations and syncope.   Respiratory: Negative for shortness of breath.    All other systems reviewed and are negative.         Objective:         /72   Pulse 62   Resp 18   Ht 152.4 cm (60\")   Wt 54.4 kg (120 lb)   BMI 23.44 kg/m²     Constitutional:       General: Not in acute distress.     Appearance: Well-developed.   Eyes:      General: No scleral icterus.     " Conjunctiva/sclera: Conjunctivae normal.      Pupils: Pupils are equal, round, and reactive to light.   HENT:      Head: Normocephalic and atraumatic.   Neck:      Thyroid: No thyromegaly.   Pulmonary:      Effort: Pulmonary effort is normal.      Breath sounds: Normal breath sounds.   Cardiovascular:      Normal rate. Regular rhythm.   Abdominal:      General: Bowel sounds are normal.      Palpations: Abdomen is soft.   Musculoskeletal: Normal range of motion.      Cervical back: Normal range of motion. Skin:     General: Skin is warm and dry.   Neurological:      Mental Status: Alert and oriented to person, place, and time.         In-Office Procedure(s):  Procedures    ASCVD RIsk Score::  The ASCVD Risk score ([place]ali[place] BEST Jr., et al., 2013) failed to calculate for the following reasons:    The 2013 ASCVD risk score is only valid for ages 40 to 79    Recent Radiology:  Imaging Results (Most Recent)     None          Lab Review:   Lab on 09/27/2021   Component Date Value   • Glucose 09/27/2021 103*   • BUN 09/27/2021 24*   • Creatinine 09/27/2021 1.37*   • Sodium 09/27/2021 138    • Potassium 09/27/2021 4.7    • Chloride 09/27/2021 100    • CO2 09/27/2021 30.7*   • Calcium 09/27/2021 9.1    • Total Protein 09/27/2021 6.0    • Albumin 09/27/2021 3.80    • ALT (SGPT) 09/27/2021 9    • AST (SGOT) 09/27/2021 15    • Alkaline Phosphatase 09/27/2021 65    • Total Bilirubin 09/27/2021 0.3    • eGFR Non  Amer 09/27/2021 36*   • eGFR   Amer 09/27/2021 44*   • Globulin 09/27/2021 2.2    • A/G Ratio 09/27/2021 1.7    • BUN/Creatinine Ratio 09/27/2021 17.5    • Anion Gap 09/27/2021 7.3    • Protime 09/27/2021 15.6*   • INR 09/27/2021 1.26*   • WBC 09/27/2021 7.34    • RBC 09/27/2021 3.93    • Hemoglobin 09/27/2021 11.8*   • Hematocrit 09/27/2021 37.4    • MCV 09/27/2021 95.2    • MCH 09/27/2021 30.0    • MCHC 09/27/2021 31.6    • RDW 09/27/2021 13.2    • RDW-SD 09/27/2021 46.5    • MPV 09/27/2021 11.9    • Platelets  09/27/2021 320    • Neutrophil % 09/27/2021 58.6    • Lymphocyte % 09/27/2021 26.4    • Monocyte % 09/27/2021 11.9    • Eosinophil % 09/27/2021 1.9    • Basophil % 09/27/2021 0.8    • Immature Grans % 09/27/2021 0.4    • Neutrophils, Absolute 09/27/2021 4.30    • Lymphocytes, Absolute 09/27/2021 1.94    • Monocytes, Absolute 09/27/2021 0.87    • Eosinophils, Absolute 09/27/2021 0.14    • Basophils, Absolute 09/27/2021 0.06    • Immature Grans, Absolute 09/27/2021 0.03    • nRBC 09/27/2021 0.0    • COVID19 09/27/2021 Not Detected                 Assessment:          Diagnosis Plan   1. Coronary artery disease involving native coronary artery of native heart without angina pectoris     2. Ischemic cardiomyopathy     3. S/P CABG (coronary artery bypass graft)     4. Valvular heart disease            Plan:      1. CAD with ischemic cardiomyopathy - EF now around 30%.  We discussed with her, her daughter and son-in-law the indication, risks and benefits of an ICD implant.  She has expressed to me, through an , that she wishes to die peacefully when her time comes and does not want an ICD or to be resuscitated.    She will followup with Dr. Uriarte      Level of Care:                 Nazario Lake MD  10/09/21  .

## 2021-11-11 ENCOUNTER — TELEPHONE (OUTPATIENT)
Dept: CARDIOLOGY | Facility: CLINIC | Age: 86
End: 2021-11-11

## 2021-11-11 NOTE — TELEPHONE ENCOUNTER
Upon review of Loop recorder report it appears the battery reached RRT on 11/7/21. Please advise patient accordingly. Thanks Keisha DIAZ

## 2021-12-10 DIAGNOSIS — Z98.890 HISTORY OF LOOP RECORDER: Primary | ICD-10-CM

## 2021-12-10 DIAGNOSIS — Z01.818 OTHER SPECIFIED PRE-OPERATIVE EXAMINATION: Primary | ICD-10-CM

## 2021-12-14 ENCOUNTER — TELEPHONE (OUTPATIENT)
Dept: CARDIOLOGY | Facility: CLINIC | Age: 86
End: 2021-12-14

## 2021-12-14 NOTE — TELEPHONE ENCOUNTER
I do not think she needs another 1.  Her current loop recorder has not demonstrated any significant arrhythmias.  If she feels safer having one in place than that is fine as well

## 2021-12-14 NOTE — TELEPHONE ENCOUNTER
CPA pt    Patient is scheduled for loop explant on 12/22/2021, battery is at end of life. Patient would like to get Dr. Uriarte's opinion on whether or not she should get a new loop recorder or if she thinks she should just have implant removed.      CB# [phone]  Sarita(daughter)

## 2021-12-15 DIAGNOSIS — I10 ESSENTIAL HYPERTENSION: Chronic | ICD-10-CM

## 2021-12-15 DIAGNOSIS — I25.5 ISCHEMIC CARDIOMYOPATHY: ICD-10-CM

## 2021-12-15 RX ORDER — METOPROLOL SUCCINATE 25 MG/1
25 TABLET, EXTENDED RELEASE ORAL DAILY
Qty: 90 TABLET | Refills: 1 | Status: SHIPPED | OUTPATIENT
Start: 2021-12-15 | End: 2022-02-11 | Stop reason: SDUPTHER

## 2021-12-15 RX ORDER — LISINOPRIL 40 MG/1
20 TABLET ORAL DAILY
Qty: 45 TABLET | Refills: 1 | Status: SHIPPED | OUTPATIENT
Start: 2021-12-15 | End: 2022-03-21 | Stop reason: DRUGHIGH

## 2021-12-15 NOTE — TELEPHONE ENCOUNTER
CPA pt    Patient requesting refill on Lisinopril 40mg 1/2 tab daily and Metoprolol 25mg 1 pill BID sent to [facility] on [address].

## 2021-12-21 NOTE — TELEPHONE ENCOUNTER
Patient was scheduled for loop explant on 12/22/2021 with Dr. Lake. Patient's daughter cancelled, states she will call back to reschedule.

## 2022-01-12 PROCEDURE — G2066 INTER DEVC REMOTE 30D: HCPCS | Performed by: INTERNAL MEDICINE

## 2022-01-12 PROCEDURE — 93298 REM INTERROG DEV EVAL SCRMS: CPT | Performed by: INTERNAL MEDICINE

## 2022-02-10 ENCOUNTER — TELEPHONE (OUTPATIENT)
Dept: CARDIOLOGY | Facility: CLINIC | Age: 87
End: 2022-02-10

## 2022-02-10 NOTE — TELEPHONE ENCOUNTER
Patient's daughter called and offered appointment to be seen today, per patient's daughter Sarita patient is doing a lot better since this morning. Would like to keep follow up visit on 02/15/2022.

## 2022-02-10 NOTE — TELEPHONE ENCOUNTER
CPA pt    Patient's daughter called, states yesterday patient did not want to eat, was fatigue and had dizziness. Would like to know if she can be seen today? Patient has been asking to see Dr. Uriarte.    # [phone]  Sarita

## 2022-02-10 NOTE — TELEPHONE ENCOUNTER
Can you go check with Dr. Uriarte, since I'm not there, she may not see the message until after clinic

## 2022-02-11 ENCOUNTER — TELEPHONE (OUTPATIENT)
Dept: CARDIOLOGY | Facility: CLINIC | Age: 87
End: 2022-02-11

## 2022-02-11 DIAGNOSIS — I25.5 ISCHEMIC CARDIOMYOPATHY: ICD-10-CM

## 2022-02-11 RX ORDER — METOPROLOL SUCCINATE 25 MG/1
25 TABLET, EXTENDED RELEASE ORAL DAILY
Qty: 90 TABLET | Refills: 1 | Status: SHIPPED | OUTPATIENT
Start: 2022-02-11

## 2022-02-11 NOTE — TELEPHONE ENCOUNTER
CPA pt    Patient's daughter called, would like to verify directions on Metoprolol 25mg, states patient has been taking 1 pill BID and her blood pressure has been normal but pharmacy told her directions are for 1 pill daily. Would also like to know if refill can be sent to [facility] on [address].    CB#[phone]  Wanda

## 2022-03-21 ENCOUNTER — OFFICE VISIT (OUTPATIENT)
Dept: CARDIOLOGY | Facility: CLINIC | Age: 87
End: 2022-03-21

## 2022-03-21 VITALS
WEIGHT: 120 LBS | SYSTOLIC BLOOD PRESSURE: 132 MMHG | BODY MASS INDEX: 23.56 KG/M2 | HEIGHT: 60 IN | HEART RATE: 76 BPM | DIASTOLIC BLOOD PRESSURE: 90 MMHG

## 2022-03-21 DIAGNOSIS — I38 VALVULAR HEART DISEASE: Chronic | ICD-10-CM

## 2022-03-21 DIAGNOSIS — E78.5 DYSLIPIDEMIA: Chronic | ICD-10-CM

## 2022-03-21 DIAGNOSIS — I25.5 ISCHEMIC CARDIOMYOPATHY: Chronic | ICD-10-CM

## 2022-03-21 DIAGNOSIS — I10 ESSENTIAL HYPERTENSION: Chronic | ICD-10-CM

## 2022-03-21 DIAGNOSIS — I25.10 CORONARY ARTERY DISEASE INVOLVING NATIVE CORONARY ARTERY OF NATIVE HEART WITHOUT ANGINA PECTORIS: Primary | Chronic | ICD-10-CM

## 2022-03-21 PROCEDURE — 99214 OFFICE O/P EST MOD 30 MIN: CPT | Performed by: INTERNAL MEDICINE

## 2022-03-21 RX ORDER — LISINOPRIL 20 MG/1
20 TABLET ORAL DAILY
Qty: 90 TABLET | Refills: 2 | Status: SHIPPED | OUTPATIENT
Start: 2022-03-21

## 2022-03-21 RX ORDER — MONTELUKAST SODIUM 10 MG/1
10 TABLET ORAL NIGHTLY
COMMUNITY
Start: 2022-03-11 | End: 2022-09-15

## 2022-03-21 NOTE — PROGRESS NOTES
Chief Complaint  Coronary Artery Disease    Subjective    History of Present Illness      I saw Evita in the office today with  present.  She is a 89-year-old female with known coronary disease.  She is status post previous coronary artery bypass grafting.  She also has essential hypertension, history of nonsustained ventricular tachycardia, ischemic cardiomyopathy, valvular heart disease.  She has a loop recorder in place.  She was admitted to [facility] in February 2021 with hypertensive urgency with neurological changes.  CT scan of her head demonstrated no intracranial bleed and MRI revealed no stroke.  She was placed on lisinopril during her hospital stay.  She was also noted to have a pulmonary embolus.  Lower extremity DVT was negative.  She was initiated on heparin and then transitioned to Eliquis at discharge.      Since her previous visit, she has had a CT angiogram of the chest which demonstrated no evidence of dissection or aneurysm.  There was moderate atherosclerosis.  There is a small filling defect in the posterior branch of the right lower lobe of the pulmonary artery consistent with pulmonary embolism.  There is a persistent irregular shaped noncalcified masslike region of consolidation involving the right upper lobe measuring up to 4 cm.  This is going to be followed by pulmonary.  She had an echocardiogram which demonstrated an ejection fraction of 28%.  Visually this appeared to be 30 to 35%.  There is moderate asymmetric septal hypertrophy.  Grade 1 diastolic dysfunction.  Moderate aortic insufficiency.  Mild to moderate mitral regurgitation and mild tricuspid regurgitation.    Evita is actually doing fairly well.  She is fairly sedentary at home.  Her family does not let her do much activity anymore.  She is not complaining of chest discomfort or dizziness.  She had an episode of palpitations approximately 1 week ago.  As mentioned above her echocardiogram in February 2021  "demonstrated an ejection fraction of 30 to 35%.  She did decline an ICD and her loop recorder has been removed.  She has mild lower extremity edema but denies orthopnea or paroxysmal nocturnal dyspnea.  She remains on apixaban but it has been over a year since she was diagnosed with her pulmonary embolus.  I will discontinue that today.       Objective   Vital Signs:   /90   Pulse 76   Ht 152.4 cm (60\")   Wt 54.4 kg (120 lb)   BMI 23.44 kg/m²     Vitals and nursing note reviewed.   Constitutional:       General: Not in acute distress.     Appearance: Well-developed and not in distress. Not diaphoretic.      Comments: Elderly female, no acute distress   Eyes:      General: No scleral icterus.     Conjunctiva/sclera: Conjunctivae normal.      Pupils: Pupils are equal, round, and reactive to light.   HENT:      Head: Normocephalic and atraumatic.   Neck:      Thyroid: No thyromegaly.      Lymphadenopathy: No cervical adenopathy.   Pulmonary:      Effort: Pulmonary effort is normal. No respiratory distress.      Breath sounds: Normal breath sounds. No wheezing. No rales.   Chest:      Comments: Loop recorder in place  Cardiovascular:      PMI at left midclavicular line. Normal rate. Regular rhythm. Normal S1. Normal S2.      Murmurs:  There is a 2/6 systolic murmur at the left lower sternal border and apex.  There is a 1/6 diastolic murmur at the mid left sternal border.      No gallop. No S3 and S4 gallop. No click. No rub.   Pulses:     Intact distal pulses. No decreased pulses.   Edema:     Peripheral edema (Trivial bilateral lower extremity edema  ) present.  Abdominal:      General: Bowel sounds are normal. There is no distension.      Palpations: Abdomen is soft. There is no abdominal mass.      Tenderness: There is no abdominal tenderness. There is no guarding or rebound.   Musculoskeletal:      Cervical back: Normal range of motion and neck supple.      Comments: In a wheelchair Skin:     General: Skin " is warm and dry.      Coloration: Skin is not pale.      Findings: No rash.   Neurological:      Mental Status: Alert and oriented to person, place, and time.      Coordination: Coordination normal.   Psychiatric:         Behavior: Behavior normal.         Result Review :                 Assessment and Plan    1. Essential hypertension  Her blood pressure is fairly well controlled.  She is on lisinopril 20 mg daily    2. Coronary artery disease involving native coronary artery of native heart without angina pectoris  No symptoms of angina.    3. Ischemic cardiomyopathy  Her previous echocardiogram demonstrated an ejection fraction of 30 to 35%.  No symptoms of heart failure    4. Dyslipidemia  She is on rosuvastatin.Her LDL and September 2021 was 77    5. Valvular heart disease  Stable    She had a pulmonary embolus in February 2021 which was noted incidentally.  She was placed on apixaban.  She has no other reason to be on the medication and she has been treated for over a year.  I will have her discontinue the medication today given her age and risk of bleeding        Follow Up   No follow-ups on file.  Patient was given instructions and counseling regarding her condition or for health maintenance advice. Please see specific information pulled into the AVS if appropriate.

## 2022-03-29 DIAGNOSIS — I10 ESSENTIAL HYPERTENSION: Chronic | ICD-10-CM

## 2022-03-29 RX ORDER — LISINOPRIL 20 MG/1
20 TABLET ORAL DAILY
Qty: 90 TABLET | Refills: 3 | Status: SHIPPED | OUTPATIENT
Start: 2022-03-29 | End: 2022-09-15 | Stop reason: SDUPTHER

## 2022-09-15 ENCOUNTER — OFFICE VISIT (OUTPATIENT)
Dept: CARDIOLOGY | Facility: CLINIC | Age: 87
End: 2022-09-15

## 2022-09-15 VITALS
HEIGHT: 60 IN | RESPIRATION RATE: 14 BRPM | BODY MASS INDEX: 24.54 KG/M2 | WEIGHT: 125 LBS | DIASTOLIC BLOOD PRESSURE: 92 MMHG | SYSTOLIC BLOOD PRESSURE: 132 MMHG | OXYGEN SATURATION: 97 % | HEART RATE: 68 BPM

## 2022-09-15 DIAGNOSIS — I10 ESSENTIAL HYPERTENSION: Chronic | ICD-10-CM

## 2022-09-15 DIAGNOSIS — I38 VALVULAR HEART DISEASE: Chronic | ICD-10-CM

## 2022-09-15 DIAGNOSIS — I25.5 ISCHEMIC CARDIOMYOPATHY: Chronic | ICD-10-CM

## 2022-09-15 DIAGNOSIS — I25.10 CORONARY ARTERY DISEASE INVOLVING NATIVE CORONARY ARTERY OF NATIVE HEART WITHOUT ANGINA PECTORIS: Primary | Chronic | ICD-10-CM

## 2022-09-15 PROBLEM — R91.8 MASS OF UPPER LOBE OF RIGHT LUNG: Status: ACTIVE | Noted: 2022-09-15

## 2022-09-15 PROCEDURE — 99214 OFFICE O/P EST MOD 30 MIN: CPT | Performed by: INTERNAL MEDICINE

## 2022-09-15 RX ORDER — NAPHAZOLINE HYDROCHLORIDE AND PHENIRAMINE MALEATE .25; 3 MG/ML; MG/ML
SOLUTION/ DROPS OPHTHALMIC
COMMUNITY
Start: 2022-08-24

## 2022-09-15 RX ORDER — LEVOFLOXACIN 500 MG/1
TABLET, FILM COATED ORAL
COMMUNITY
Start: 2022-09-11

## 2022-09-15 NOTE — PROGRESS NOTES
Chief Complaint  Follow-up (4 mo ), Hypertension, Coronary Artery Disease, Hyperlipidemia, and Cardiac Valve Problem    Subjective    History of Present Illness      I saw Evita in the office today with  present.  She is a 89-year-old female with known coronary disease.  She is status post previous coronary artery bypass grafting.  She also has essential hypertension, history of nonsustained ventricular tachycardia, ischemic cardiomyopathy, valvular heart disease.  She has a loop recorder in place.  She has a slow-growing right upper lobe lung mass which has been followed by pulmonary.  She was admitted to [facility] in February 2021 with hypertensive urgency with neurological changes.  CT scan of her head demonstrated no intracranial bleed and MRI revealed no stroke.  She was placed on lisinopril during her hospital stay.  She was also noted to have a pulmonary embolus.  Lower extremity DVT was negative.  She was initiated on heparin and then transitioned to Eliquis at discharge.     A nuclear scan in July 2022 demonstrated a medium to large sized moderately severe fixed perfusion defect in the basal inferolateral, mid inferolateral, apical anterior, apical inferior wall consistent with infarction.  No ischemia.  Medical treatment    Ms. David was admitted to [facility] on 9/7/2022 with community-acquired bilateral lower lobe pneumonia.  She also had hypertensive urgency upon admission.  She was treated with Levaquin.  She had an echocardiogram on 9/8/2022 which demonstrated an ejection fraction of 30 to 35%.  There was inferior and lateral wall hypokinesis.  Moderate to severe mitral regurgitation.  Mild tricuspid regurgitation.  Mild to moderate aortic regurgitation.    In the office today, Evita is accompanied by her daughter-in-law.  An  was present.  She had some sharp chest discomfort earlier today, atypical for angina.  She states that her breathing has improved since  "treatment for her pneumonia.  She remains very sedentary.  No palpitations.  Mild lower extremity edema.  No orthopnea or paroxysmal nocturnal dyspnea    Objective   Vital Signs:   /92   Pulse 68   Resp 14   Ht 152.4 cm (60\")   Wt 56.7 kg (125 lb)   SpO2 97%   BMI 24.41 kg/m²     Vitals and nursing note reviewed.   Constitutional:       General: Not in acute distress.     Appearance: Well-developed and not in distress. Not diaphoretic.      Comments: Elderly female, no acute distress   Eyes:      General: No scleral icterus.     Conjunctiva/sclera: Conjunctivae normal.      Pupils: Pupils are equal, round, and reactive to light.   HENT:      Head: Normocephalic and atraumatic.   Neck:      Thyroid: No thyromegaly.      Lymphadenopathy: No cervical adenopathy.   Pulmonary:      Effort: Pulmonary effort is normal. No respiratory distress.      Breath sounds: No wheezing. No rales.      Comments: Small area of left lower lobe crackles  Chest:      Comments: Loop recorder in place  Cardiovascular:      PMI at left midclavicular line. Normal rate. Regular rhythm. Normal S1. Normal S2.      Murmurs:  There is a 2/6 systolic murmur at the left lower sternal border and apex.  There is a 1/6 diastolic murmur at the mid left sternal border.      No gallop. No S3 and S4 gallop. No click. No rub.   Pulses:     Intact distal pulses. No decreased pulses.   Edema:     Peripheral edema (Trivial bilateral lower extremity edema  ) present.  Abdominal:      General: Bowel sounds are normal. There is no distension.      Palpations: Abdomen is soft.      Tenderness: There is no abdominal tenderness. There is no guarding or rebound.   Musculoskeletal:      Cervical back: Normal range of motion and neck supple.      Comments: In a wheelchair Skin:     General: Skin is warm and dry.      Coloration: Skin is not pale.      Findings: No rash.   Neurological:      Mental Status: Alert and oriented to person, place, and time.      " Coordination: Coordination normal.   Psychiatric:         Behavior: Behavior normal.         Result Review :{ Labs  Result Review  Imaging  Med Tab  Media :23}                 Assessment and Plan    1. Coronary artery disease involving native coronary artery of native heart without angina pectoris  2008: CABG: LIMA to LAD, saphenous vein graft to D1, OM1 and RCA  2013: Cath: LAD 60 to 70%, D1 90%, circumflex 90% and %.  LIMA to the LAD was occluded, saphenous vein graft to D1 and OM1 was occluded.  Saphenous vein graft to RCA was patent.  2013: Elective PCI of the left circumflex was abandoned secondary to inability to pass the wire past the lesion  2017: Nuclear: Prior inferior and inferolateral wall infarction with minimal jerry-infarction ischemia  7/2022: medium to large sized moderately severe fixed perfusion defect in the basal inferolateral, mid inferolateral, apical anterior, apical inferior wall consistent with infarction.  No ischemia.  Medical treatment        2. Essential hypertension  Fair control    3. Ischemic cardiomyopathy  Most recent ejection fraction was 30 to 35% and September 2022    4. Valvular heart disease  Echo September 2022 revealed moderate to severe mitral insufficiency.  Mild tricuspid insufficiency and mild to moderate aortic insufficiency    I have not changed her medications.  She is relatively stable.  Conservative treatment from a cardiac standpoint.        Follow Up   No follow-ups on file.  Patient was given instructions and counseling regarding her condition or for health maintenance advice. Please see specific information pulled into the AVS if appropriate.